# Patient Record
Sex: FEMALE | Race: WHITE | NOT HISPANIC OR LATINO | Employment: STUDENT | URBAN - METROPOLITAN AREA
[De-identification: names, ages, dates, MRNs, and addresses within clinical notes are randomized per-mention and may not be internally consistent; named-entity substitution may affect disease eponyms.]

---
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
## 2020-04-09 DIAGNOSIS — Z30.41 ORAL CONTRACEPTIVE USE: ICD-10-CM

## 2020-04-09 RX ORDER — LEVONORGESTREL AND ETHINYL ESTRADIOL 0.1-0.02MG
KIT ORAL
Qty: 84 TABLET | Refills: 0 | Status: SHIPPED | OUTPATIENT
Start: 2020-04-09 | End: 2020-05-15

## 2020-05-15 ENCOUNTER — TELEMEDICINE (OUTPATIENT)
Dept: GYNECOLOGY | Facility: CLINIC | Age: 20
End: 2020-05-15
Payer: COMMERCIAL

## 2020-05-15 VITALS — WEIGHT: 230 LBS | BODY MASS INDEX: 40.74 KG/M2

## 2020-05-15 DIAGNOSIS — Z30.41 ORAL CONTRACEPTIVE USE: ICD-10-CM

## 2020-05-15 PROCEDURE — 99213 OFFICE O/P EST LOW 20 MIN: CPT | Performed by: NURSE PRACTITIONER

## 2020-05-15 RX ORDER — LEVONORGESTREL AND ETHINYL ESTRADIOL 0.1-0.02MG
1 KIT ORAL DAILY
Qty: 84 TABLET | Refills: 0 | Status: SHIPPED | OUTPATIENT
Start: 2020-05-15 | End: 2020-07-30 | Stop reason: SDUPTHER

## 2020-07-29 NOTE — PROGRESS NOTES
Assessment/Plan:  NGE  Menorrhagia with irregular menses  Menstrual migraines- both controlled with OCPs  BCM- NA         Pap smear q 3yrs  p 21 p [place]  Cervical Cultures till 25        "                                                                        RTO 1 yr  SBA monthly                                                                              Exercise 3/ wk  - 0 to 4/wk, encouraged 4                                                                                       Calcium 1,000 mg/d with Vit D                    Depression Screen: Neg, controlled on medication   Diagnoses and all orders for this visit:    Encounter for annual routine gynecological examination    Menorrhagia with irregular cycle    Oral contraceptive use  -     levonorgestrel-ethinyl estradiol (AVIANE,ALESSE,LESSINA) 0 1-20 MG-MCG per tablet; Take 1 tablet by mouth daily    BMI 38 0-38 9,adult    Other orders  -     doxycycline hyclate (VIBRA-TABS) 100 mg tablet; take 1 tablet by mouth once daily  AN HOUR BEFORE DINNER WITH BUD     (REFER TO PRESCRIPTION NOTES)  Subjective:        Patient ID: Donald Roe is a 21 y o  female  Raisa Yao is here for her yearly evaluation  She is without any gynecologic complaints  Her heavy irregular periods are controlled with oral contraceptives  Her menstrual migraines have resolved with oral contraceptives  And her anxiety is even better  The following portions of the patient's history were reviewed and updated as appropriate: She  has a past medical history of Anxiety and Depression    Patient Active Problem List    Diagnosis Date Noted    Encntr for gyn exam (general) (routine) w/o abn findings 05/09/2019    Oral contraceptive use 05/09/2019    BMI 39 0-39 9,adult 05/09/2019   PMH:  Eczema- as a child  Menarche  Anxiety 7  Depression '15  Irregular Menses - OC's '17  Obesity  Eczema flare 7/20  She  has a past surgical history that includes [place] tooth extraction and Adenoidectomy  Her family history includes Hypertension in her maternal grandmother; No Known Problems in her father, mother, and sister; Prostate cancer in her maternal grandfather and paternal grandfather  FH:  MGF- d 12/19 86 Lymphoma  PGF- Leukemia, terminal  She  reports that she has never smoked  She has never used smokeless tobacco  She reports that she does not drink alcohol or use drugs  SH:  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[place] [address]  September classes will be on line  [employer]  Current Outpatient Medications   Medication Sig Dispense Refill    doxycycline hyclate (VIBRA-TABS) 100 mg tablet take 1 tablet by mouth once daily  AN HOUR BEFORE DINNER WITH BUD     (REFER TO PRESCRIPTION NOTES)   levonorgestrel-ethinyl estradiol (AVIANE,ALESSE,LESSINA) 0 1-20 MG-MCG per tablet Take 1 tablet by mouth daily 84 tablet 3    sertraline (ZOLOFT) 100 mg tablet Take 50 mg by mouth daily      sertraline (ZOLOFT) 25 mg tablet Take 50 mg by mouth daily       No current facility-administered medications for this visit  Current Outpatient Medications on File Prior to Visit   Medication Sig    doxycycline hyclate (VIBRA-TABS) 100 mg tablet take 1 tablet by mouth once daily  AN HOUR BEFORE DINNER WITH BUD     (REFER TO PRESCRIPTION NOTES)   sertraline (ZOLOFT) 100 mg tablet Take 50 mg by mouth daily    sertraline (ZOLOFT) 25 mg tablet Take 50 mg by mouth daily    [DISCONTINUED] levonorgestrel-ethinyl estradiol (AVIANE,ALESSE,LESSINA) 0 1-20 MG-MCG per tablet Take 1 tablet by mouth daily     No current facility-administered medications on file prior to visit  She has No Known Allergies       Review of Systems   Constitutional: Negative for activity change, appetite change, chills, fatigue, fever and unexpected weight change     HENT: Negative for congestion, rhinorrhea, sinus pressure, sore throat and trouble swallowing  Eyes: Negative for discharge, redness, itching and visual disturbance  Respiratory: Negative for cough, chest tightness, shortness of breath and wheezing  Cardiovascular: Negative for chest pain, palpitations and leg swelling  Gastrointestinal: Negative for abdominal distention, abdominal pain, blood in stool, constipation, diarrhea, nausea and vomiting  Genitourinary: Negative for decreased urine volume, difficulty urinating, dyspareunia, dysuria, frequency, hematuria, menstrual problem, pelvic pain, urgency, vaginal bleeding, vaginal discharge and vaginal pain  Virgin   Musculoskeletal: Negative for arthralgias  Skin: Positive for rash (Arms and hands)  Neurological: Negative for weakness, light-headedness, numbness and headaches  Hematological: Does not bruise/bleed easily  Psychiatric/Behavioral: Positive for dysphoric mood  Negative for agitation, behavioral problems and sleep disturbance  The patient is nervous/anxious  Objective:    Vitals:    07/30/20 1054   BP: 118/70   BP Location: Left arm   Patient Position: Sitting   Cuff Size: Standard   Temp: 97 6 °F (36 4 °C)   Weight: 102 kg (224 lb 6 4 oz)   Height: 5' 4" (1 626 m)            Physical Exam   Constitutional: She is oriented to person, place, and time  She appears well-developed and well-nourished  No distress  HENT:   Head: Normocephalic and atraumatic  Eyes: EOM are normal  Right eye exhibits no discharge  Left eye exhibits no discharge  Neck: Normal range of motion  Neck supple  No tracheal deviation present  No thyromegaly present  Cardiovascular: Normal rate, regular rhythm and normal heart sounds  No murmur heard  Pulmonary/Chest: Breath sounds normal  She is in respiratory distress  Abdominal: Soft  She exhibits no distension and no mass  There is no tenderness  There is no rebound and no guarding     Genitourinary: Pelvic exam was performed with patient supine  Genitourinary Comments: Mons normal, normal hair distribution   Musculoskeletal: Normal range of motion  She exhibits no edema or deformity  Lymphadenopathy:     She has no cervical adenopathy  No inguinal adenopathy noted on the right or left side  Neurological: She is alert and oriented to person, place, and time  Skin: Skin is warm and dry  Psychiatric: She has a normal mood and affect  Her behavior is normal  Judgment and thought content normal    Nursing note and vitals reviewed

## 2020-07-30 ENCOUNTER — ANNUAL EXAM (OUTPATIENT)
Dept: GYNECOLOGY | Facility: CLINIC | Age: 20
End: 2020-07-30
Payer: COMMERCIAL

## 2020-07-30 VITALS
HEIGHT: 64 IN | BODY MASS INDEX: 38.31 KG/M2 | SYSTOLIC BLOOD PRESSURE: 118 MMHG | WEIGHT: 224.4 LBS | TEMPERATURE: 97.6 F | DIASTOLIC BLOOD PRESSURE: 70 MMHG

## 2020-07-30 DIAGNOSIS — Z30.41 ORAL CONTRACEPTIVE USE: ICD-10-CM

## 2020-07-30 DIAGNOSIS — Z01.419 ENCOUNTER FOR ANNUAL ROUTINE GYNECOLOGICAL EXAMINATION: Primary | ICD-10-CM

## 2020-07-30 DIAGNOSIS — N92.1 MENORRHAGIA WITH IRREGULAR CYCLE: ICD-10-CM

## 2020-07-30 PROCEDURE — 99395 PREV VISIT EST AGE 18-39: CPT | Performed by: OBSTETRICS & GYNECOLOGY

## 2020-07-30 RX ORDER — DOXYCYCLINE HYCLATE 100 MG
TABLET ORAL
COMMUNITY
Start: 2020-06-18 | End: 2021-03-02 | Stop reason: ALTCHOICE

## 2020-07-30 RX ORDER — LEVONORGESTREL AND ETHINYL ESTRADIOL 0.1-0.02MG
1 KIT ORAL DAILY
Qty: 84 TABLET | Refills: 3 | Status: SHIPPED | OUTPATIENT
Start: 2020-07-30 | End: 2021-08-15

## 2021-03-02 ENCOUNTER — OFFICE VISIT (OUTPATIENT)
Dept: FAMILY MEDICINE CLINIC | Facility: CLINIC | Age: 21
End: 2021-03-02
Payer: COMMERCIAL

## 2021-03-02 VITALS
HEIGHT: 64 IN | HEART RATE: 64 BPM | RESPIRATION RATE: 16 BRPM | WEIGHT: 233.2 LBS | SYSTOLIC BLOOD PRESSURE: 120 MMHG | DIASTOLIC BLOOD PRESSURE: 72 MMHG | BODY MASS INDEX: 39.81 KG/M2

## 2021-03-02 DIAGNOSIS — E66.01 MORBID OBESITY WITH BMI OF 40.0-44.9, ADULT (HCC): ICD-10-CM

## 2021-03-02 DIAGNOSIS — Z11.59 ENCOUNTER FOR SCREENING FOR OTHER VIRAL DISEASES: ICD-10-CM

## 2021-03-02 DIAGNOSIS — F41.8 DEPRESSION WITH ANXIETY: Primary | ICD-10-CM

## 2021-03-02 DIAGNOSIS — N62 MACROMASTIA: ICD-10-CM

## 2021-03-02 PROCEDURE — 3008F BODY MASS INDEX DOCD: CPT | Performed by: FAMILY MEDICINE

## 2021-03-02 PROCEDURE — 3725F SCREEN DEPRESSION PERFORMED: CPT | Performed by: FAMILY MEDICINE

## 2021-03-02 PROCEDURE — 99203 OFFICE O/P NEW LOW 30 MIN: CPT | Performed by: FAMILY MEDICINE

## 2021-03-02 PROCEDURE — 1036F TOBACCO NON-USER: CPT | Performed by: FAMILY MEDICINE

## 2021-03-02 RX ORDER — SERTRALINE HYDROCHLORIDE 25 MG/1
25 TABLET, FILM COATED ORAL DAILY
Qty: 90 TABLET | Refills: 1 | Status: SHIPPED | OUTPATIENT
Start: 2021-03-02 | End: 2021-08-06 | Stop reason: SDUPTHER

## 2021-03-02 RX ORDER — SERTRALINE HYDROCHLORIDE 100 MG/1
100 TABLET, FILM COATED ORAL DAILY
Qty: 90 TABLET | Refills: 1 | Status: SHIPPED | OUTPATIENT
Start: 2021-03-02 | End: 2021-08-06 | Stop reason: SDUPTHER

## 2021-03-02 NOTE — PROGRESS NOTES
Assessment/Plan:    1  Depression with anxiety  - continue Zoloft 125 mg daily, doing well  - follow up with the therapist  - sertraline (ZOLOFT) 25 mg tablet; Take 1 tablet (25 mg total) by mouth daily in addition to 100 mg for a total of 125 mg daily  Dispense: 90 tablet; Refill: 1  - sertraline (ZOLOFT) 100 mg tablet; Take 1 tablet (100 mg total) by mouth daily  Dispense: 90 tablet; Refill: 1    2  Macromastia  - scheduled for breast reduction on 03/16/21  - Novel Coronavirus (Covid-19),PCR [facility] - Collected at [employer] [facility] or [facility]; Future    3  Morbid obesity with BMI of 40 0-44 9     - discussed life style changes    The patient understands and agrees with the treatment plan  Subjective:   Chief Complaint   Patient presents with    [facility]    Breast Problem     Pt states she is having breast reductions surgery and she needs a negative COVID test before the surgery  Surgery 03/16/2021      Patient ID: Mynor Bronson is a 21 y o  female here today for her initial visit as a new patient to be established  Patient is scheduled for breast reduction surgery in [place] on 03/16/21 and needs Covid test 5 days prior  Patient is following with Psychiatrist Dr Tonny Hamilton for depression/ anxiety and doing well on Zoloft 125 mg daily, she has been on Zoloft for about 4 years  She is also seeing her therapist 1-2 times a month  Patient would like to follow up here for her depression  Patient is currently on OCP's for irregular heavy periods with good results         The following portions of the patient's history were reviewed and updated as appropriate: allergies, current medications, past family history, past medical history, past social history, past surgical history and problem list     Past Medical History:   Diagnosis Date    Anxiety     Depression      Past Surgical History:   Procedure Laterality Date    ADENOIDECTOMY      Last assessed 6/14/2014     WISDOM TOOTH EXTRACTION       Family History   Problem Relation Age of Onset    Depression Mother     No Known Problems Father     No Known Problems Sister     Hypertension Maternal Grandmother     Depression Maternal Grandmother     Prostate cancer Maternal Grandfather     Prostate cancer Paternal Grandfather     Depression Maternal Aunt     Alcohol abuse Neg Hx     Substance Abuse Neg Hx      Social History     Socioeconomic History    Marital status: Single     Spouse name: Not on file    Number of children: Not on file    Years of education: Not on file    Highest education level: Not on file   Occupational History    Not on file   Social Needs    Financial resource strain: Not on file    Food insecurity     Worry: Not on file     Inability: Not on file    Transportation needs     Medical: Not on file     Non-medical: Not on file   Tobacco Use    Smoking status: Never Smoker    Smokeless tobacco: Never Used   Substance and Sexual Activity    Alcohol use: Yes     Comment: Rare    Drug use: Never    Sexual activity: Never   Lifestyle    Physical activity     Days per week: Not on file     Minutes per session: Not on file    Stress: Not on file   Relationships    Social connections     Talks on phone: Not on file     Gets together: Not on file     Attends Islam service: Not on file     Active member of club or organization: Not on file     Attends meetings of clubs or organizations: Not on file     Relationship status: Not on file    Intimate partner violence     Fear of current or ex partner: Not on file     Emotionally abused: Not on file     Physically abused: Not on file     Forced sexual activity: Not on file   Other Topics Concern    Not on file   Social History Narrative    Student: 12th grade, [employer]        Current Outpatient Medications:     levonorgestrel-ethinyl estradiol (AVIANE,ALESSE,LESSINA) 0 1-20 MG-MCG per tablet, Take 1 tablet by mouth daily, Disp: 84 tablet, Rfl: 3    sertraline (ZOLOFT) 100 mg tablet, Take 1 tablet (100 mg total) by mouth daily, Disp: 90 tablet, Rfl: 1    sertraline (ZOLOFT) 25 mg tablet, Take 1 tablet (25 mg total) by mouth daily in addition to 100 mg for a total of 125 mg daily, Disp: 90 tablet, Rfl: 1    Review of Systems   Constitutional: Negative for appetite change, chills, fatigue, fever and unexpected weight change  HENT: Negative for congestion, rhinorrhea and sore throat  Eyes: Negative for pain, discharge, redness, itching and visual disturbance  Respiratory: Negative for cough, shortness of breath and wheezing  Cardiovascular: Negative for chest pain, palpitations and leg swelling  Gastrointestinal: Negative for abdominal pain, blood in stool, constipation, diarrhea, nausea and vomiting  Endocrine: Negative for cold intolerance, heat intolerance, polydipsia, polyphagia and polyuria  Genitourinary: Negative for difficulty urinating, dysuria, flank pain, frequency, hematuria, pelvic pain and urgency  Musculoskeletal: Positive for back pain  Negative for arthralgias, joint swelling and myalgias  Neurological: Negative for dizziness, syncope, weakness, numbness and headaches  Hematological: Negative for adenopathy  Psychiatric/Behavioral: Negative for dysphoric mood, sleep disturbance and suicidal ideas  The patient is not nervous/anxious  Objective:    Vitals:    03/02/21 1755   BP: 120/72   Pulse: 64   Resp: 16   Weight: 106 kg (233 lb 3 2 oz)   Height: 5' 4" (1 626 m)        Physical Exam  Constitutional:       General: She is not in acute distress  Appearance: She is well-developed  She is obese  HENT:      Right Ear: Tympanic membrane and ear canal normal       Left Ear: Tympanic membrane and ear canal normal       Mouth/Throat:      Mouth: Mucous membranes are moist       Pharynx: Oropharynx is clear  Eyes:      Extraocular Movements: Extraocular movements intact        Conjunctiva/sclera: Conjunctivae normal       Pupils: Pupils are equal, round, and reactive to light  Neck:      Musculoskeletal: Neck supple  Thyroid: No thyromegaly  Cardiovascular:      Rate and Rhythm: Normal rate and regular rhythm  Heart sounds: Normal heart sounds  No murmur  Pulmonary:      Effort: Pulmonary effort is normal  No respiratory distress  Breath sounds: No wheezing, rhonchi or rales  Abdominal:      General: There is no distension  Palpations: Abdomen is soft  There is no mass  Tenderness: There is no abdominal tenderness  Musculoskeletal:      Right lower leg: No edema  Left lower leg: No edema  Lymphadenopathy:      Cervical: No cervical adenopathy  Neurological:      Mental Status: She is alert and oriented to person, place, and time  Psychiatric:         Mood and Affect: Mood normal          Behavior: Behavior normal          Thought Content: Thought content normal         BMI Counseling: Body mass index is 40 03 kg/m²  The BMI is above normal  Nutrition recommendations include reducing portion sizes, 3-5 servings of fruits/vegetables daily and consuming healthier snacks  Exercise recommendations include exercising 3-5 times per week

## 2021-03-10 ENCOUNTER — TELEPHONE (OUTPATIENT)
Dept: FAMILY MEDICINE CLINIC | Facility: CLINIC | Age: 21
End: 2021-03-10

## 2021-03-10 DIAGNOSIS — Z11.59 ENCOUNTER FOR SCREENING FOR OTHER VIRAL DISEASES: ICD-10-CM

## 2021-03-10 LAB — SARS-COV-2 RNA RESP QL NAA+PROBE: NEGATIVE

## 2021-03-10 PROCEDURE — U0003 INFECTIOUS AGENT DETECTION BY NUCLEIC ACID (DNA OR RNA); SEVERE ACUTE RESPIRATORY SYNDROME CORONAVIRUS 2 (SARS-COV-2) (CORONAVIRUS DISEASE [COVID-19]), AMPLIFIED PROBE TECHNIQUE, MAKING USE OF HIGH THROUGHPUT TECHNOLOGIES AS DESCRIBED BY CMS-2020-01-R: HCPCS | Performed by: FAMILY MEDICINE

## 2021-03-10 PROCEDURE — U0005 INFEC AGEN DETEC AMPLI PROBE: HCPCS | Performed by: FAMILY MEDICINE

## 2021-03-10 NOTE — TELEPHONE ENCOUNTER
----- Message from Emiliana Bocanegra MD sent at 3/10/2021  5:40 PM EST -----  Please let patient know that her COVID-19 swab was negative

## 2021-04-02 ENCOUNTER — TELEMEDICINE (OUTPATIENT)
Dept: FAMILY MEDICINE CLINIC | Facility: CLINIC | Age: 21
End: 2021-04-02
Payer: COMMERCIAL

## 2021-04-02 DIAGNOSIS — Z20.822 EXPOSURE TO COVID-19 VIRUS: Primary | ICD-10-CM

## 2021-04-02 PROCEDURE — 1036F TOBACCO NON-USER: CPT | Performed by: FAMILY MEDICINE

## 2021-04-02 PROCEDURE — 99212 OFFICE O/P EST SF 10 MIN: CPT | Performed by: FAMILY MEDICINE

## 2021-04-04 ENCOUNTER — APPOINTMENT (OUTPATIENT)
Dept: LAB | Facility: HOSPITAL | Age: 21
End: 2021-04-04
Payer: COMMERCIAL

## 2021-04-04 DIAGNOSIS — Z20.822 EXPOSURE TO COVID-19 VIRUS: ICD-10-CM

## 2021-04-04 PROCEDURE — U0005 INFEC AGEN DETEC AMPLI PROBE: HCPCS

## 2021-04-04 PROCEDURE — U0003 INFECTIOUS AGENT DETECTION BY NUCLEIC ACID (DNA OR RNA); SEVERE ACUTE RESPIRATORY SYNDROME CORONAVIRUS 2 (SARS-COV-2) (CORONAVIRUS DISEASE [COVID-19]), AMPLIFIED PROBE TECHNIQUE, MAKING USE OF HIGH THROUGHPUT TECHNOLOGIES AS DESCRIBED BY CMS-2020-01-R: HCPCS

## 2021-04-05 ENCOUNTER — TELEPHONE (OUTPATIENT)
Dept: FAMILY MEDICINE CLINIC | Facility: CLINIC | Age: 21
End: 2021-04-05

## 2021-04-05 LAB — SARS-COV-2 N GENE RESP QL NAA+PROBE: NEGATIVE

## 2021-04-05 NOTE — TELEPHONE ENCOUNTER
----- Message from Sharmin Staton MD sent at 4/5/2021  8:22 AM EDT -----  Please let patient know that her COVID-19 swab was negative

## 2021-08-04 DIAGNOSIS — Z30.41 ORAL CONTRACEPTIVE USE: ICD-10-CM

## 2021-08-05 RX ORDER — MINOCYCLINE HYDROCHLORIDE 100 MG/1
TABLET ORAL
COMMUNITY
Start: 2021-04-29 | End: 2022-01-03 | Stop reason: ALTCHOICE

## 2021-08-05 RX ORDER — MINOCYCLINE 40 MG/G
AEROSOL, FOAM TOPICAL
COMMUNITY
Start: 2021-08-02 | End: 2022-01-03 | Stop reason: ALTCHOICE

## 2021-08-05 RX ORDER — BETAMETHASONE DIPROPIONATE 0.5 MG/G
CREAM TOPICAL
COMMUNITY
Start: 2021-07-27

## 2021-08-05 RX ORDER — LEVONORGESTREL AND ETHINYL ESTRADIOL 0.1-0.02MG
KIT ORAL
Qty: 84 TABLET | Refills: 3 | OUTPATIENT
Start: 2021-08-05

## 2021-08-05 RX ORDER — ADAPALENE AND BENZOYL PEROXIDE 3; 25 MG/G; MG/G
GEL TOPICAL
COMMUNITY
Start: 2021-05-17

## 2021-08-06 ENCOUNTER — OFFICE VISIT (OUTPATIENT)
Dept: FAMILY MEDICINE CLINIC | Facility: CLINIC | Age: 21
End: 2021-08-06
Payer: COMMERCIAL

## 2021-08-06 VITALS
SYSTOLIC BLOOD PRESSURE: 110 MMHG | DIASTOLIC BLOOD PRESSURE: 64 MMHG | BODY MASS INDEX: 39.81 KG/M2 | RESPIRATION RATE: 18 BRPM | HEIGHT: 64 IN | OXYGEN SATURATION: 98 % | HEART RATE: 61 BPM | WEIGHT: 233.2 LBS

## 2021-08-06 DIAGNOSIS — Z13.29 SCREENING FOR THYROID DISORDER: ICD-10-CM

## 2021-08-06 DIAGNOSIS — Z13.1 SCREENING FOR DIABETES MELLITUS: ICD-10-CM

## 2021-08-06 DIAGNOSIS — Z00.00 ANNUAL PHYSICAL EXAM: Primary | ICD-10-CM

## 2021-08-06 DIAGNOSIS — F41.8 DEPRESSION WITH ANXIETY: ICD-10-CM

## 2021-08-06 DIAGNOSIS — Z13.228 SCREENING FOR METABOLIC DISORDER: ICD-10-CM

## 2021-08-06 DIAGNOSIS — Z13.0 SCREENING FOR BLOOD DISEASE: ICD-10-CM

## 2021-08-06 DIAGNOSIS — Z13.220 SCREENING FOR CHOLESTEROL LEVEL: ICD-10-CM

## 2021-08-06 PROCEDURE — 99395 PREV VISIT EST AGE 18-39: CPT | Performed by: FAMILY MEDICINE

## 2021-08-06 PROCEDURE — 3725F SCREEN DEPRESSION PERFORMED: CPT | Performed by: FAMILY MEDICINE

## 2021-08-06 PROCEDURE — 1036F TOBACCO NON-USER: CPT | Performed by: FAMILY MEDICINE

## 2021-08-06 PROCEDURE — 3008F BODY MASS INDEX DOCD: CPT | Performed by: FAMILY MEDICINE

## 2021-08-06 RX ORDER — SERTRALINE HYDROCHLORIDE 25 MG/1
25 TABLET, FILM COATED ORAL DAILY
Qty: 90 TABLET | Refills: 3 | Status: SHIPPED | OUTPATIENT
Start: 2021-08-06 | End: 2022-08-01 | Stop reason: SDUPTHER

## 2021-08-06 RX ORDER — SERTRALINE HYDROCHLORIDE 100 MG/1
100 TABLET, FILM COATED ORAL DAILY
Qty: 90 TABLET | Refills: 3 | Status: SHIPPED | OUTPATIENT
Start: 2021-08-06 | End: 2022-08-01 | Stop reason: SDUPTHER

## 2021-08-06 NOTE — PATIENT INSTRUCTIONS

## 2021-08-06 NOTE — PROGRESS NOTES
ADULT ANNUAL PHYSICAL  [facility]    NAME: Mervat Grey  AGE: 24 y o  SEX: female  : 2000     DATE: 2021     Assessment and Plan:     1  Annual physical exam  - will obtain immunization records from prior PCP  - will obtain screening labs and call with the results  - CBC and differential; Future  - Lipid Panel with Direct LDL reflex; Future  - Comprehensive metabolic panel; Future  - TSH, 3rd generation with Free T4 reflex; Future    2  Depression with anxiety  - continue Zoloft 125 mg daily, doing well  - follow up with the therapist    Immunizations and preventive care screenings were discussed with patient today  Appropriate education was printed on patient's after visit summary  Counseling:  Alcohol/drug use: discussed moderation in alcohol intake, the recommendations for healthy alcohol use, and avoidance of illicit drug use  Dental Health: discussed importance of regular tooth brushing, flossing, and dental visits  Injury prevention: discussed safety/seat belts, safety helmets, smoke detectors, carbon dioxide detectors, and smoking near bedding or upholstery  Sexual health: discussed sexually transmitted diseases, partner selection, use of condoms, avoidance of unintended pregnancy, and contraceptive alternatives  · Exercise: the importance of regular exercise/physical activity was discussed  Recommend exercise 3-5 times per week for at least 30 minutes  Return in about 5 months (around 1/10/2022) for Recheck  Chief Complaint:     Chief Complaint   Patient presents with    Well Check      History of Present Illness:     Adult Annual Physical   Patient here for a comprehensive physical exam  The patient reports no problems  Diet and Physical Activity  · Diet/Nutrition: well balanced diet  · Exercise: walking        Depression Screening  PHQ-9 Depression Screening    PHQ-9:   Frequency of the following problems over the past two weeks:      Little interest or pleasure in doing things: 0 - not at all  Feeling down, depressed, or hopeless: 0 - not at all  PHQ-2 Score: 0       General Health  · Sleep: sleeps well  · Hearing: normal - bilateral   · Vision: goes for regular eye exams, wears glasses and wears contacts  · Dental: regular dental visits  /GYN Health  · Last menstrual period: regular periods on OCP's, no issues  · Contraceptive method: oral contraceptives  · History of STDs?: no      Review of Systems:     Review of Systems   Constitutional: Negative for appetite change, chills, fatigue, fever and unexpected weight change  HENT: Negative for congestion, ear pain, nosebleeds, rhinorrhea, sore throat and trouble swallowing  Eyes: Negative for pain, discharge, redness, itching and visual disturbance  Respiratory: Negative for cough, shortness of breath and wheezing  Cardiovascular: Negative for chest pain, palpitations and leg swelling  Gastrointestinal: Negative for abdominal pain, blood in stool, constipation, diarrhea, nausea and vomiting  Endocrine: Negative for cold intolerance, heat intolerance, polydipsia and polyuria  Genitourinary: Negative for dysuria, frequency, hematuria, pelvic pain, urgency and vaginal discharge  Musculoskeletal: Negative for arthralgias, back pain, joint swelling and myalgias  Skin: Negative for rash  Neurological: Negative for dizziness, syncope, weakness, numbness and headaches  Hematological: Negative for adenopathy  Psychiatric/Behavioral: Negative for dysphoric mood and sleep disturbance  The patient is not nervous/anxious         Past Medical History:     Past Medical History:   Diagnosis Date    Anxiety     Depression       Past Surgical History:     Past Surgical History:   Procedure Laterality Date    ADENOIDECTOMY      Last assessed 6/14/2014     BREAST SURGERY  03/16/2021    breast reduction    WISDOM TOOTH EXTRACTION        Social History:     Social History     Socioeconomic History    Marital status: Single     Spouse name: None    Number of children: None    Years of education: None    Highest education level: None   Occupational History    None   Tobacco Use    Smoking status: Never Smoker    Smokeless tobacco: Never Used   Vaping Use    Vaping Use: Never used   Substance and Sexual Activity    Alcohol use: Yes     Comment: Rare    Drug use: Never    Sexual activity: Never   Other Topics Concern    None   Social History Narrative    Student: 12th grade, [employer]      Social Determinants of Health     Financial Resource Strain:     Difficulty of Paying Living Expenses:    Food Insecurity:     Worried About Running Out of Food in the Last Year:     Ran Out of Food in the Last Year:    Transportation Needs:     Lack of Transportation (Medical):      Lack of Transportation (Non-Medical):    Physical Activity:     Days of Exercise per Week:     Minutes of Exercise per Session:    Stress:     Feeling of Stress :    Social Connections:     Frequency of Communication with Friends and Family:     Frequency of Social Gatherings with Friends and Family:     Attends Bahai Services:     Active Member of Clubs or Organizations:     Attends Club or Organization Meetings:     Marital Status:    Intimate Partner Violence:     Fear of Current or Ex-Partner:     Emotionally Abused:     Physically Abused:     Sexually Abused:       Family History:     Family History   Problem Relation Age of Onset    Depression Mother     Hyperlipidemia Father     No Known Problems Sister     Hypertension Maternal Grandmother     Depression Maternal Grandmother     Prostate cancer Maternal Grandfather     Prostate cancer Paternal Grandfather     Depression Maternal Aunt     Alcohol abuse Neg Hx     Substance Abuse Neg Hx       Current Medications:     Current Outpatient Medications   Medication Sig Dispense Refill    Amzeeq 4 % FOAM       betamethasone dipropionate (DIPROSONE) 0 05 % cream       Epiduo Forte 0 3-2 5 % GEL       levonorgestrel-ethinyl estradiol (AVIANE,ALESSE,LESSINA) 0 1-20 MG-MCG per tablet Take 1 tablet by mouth daily 84 tablet 3    sertraline (ZOLOFT) 100 mg tablet Take 1 tablet (100 mg total) by mouth daily 90 tablet 3    sertraline (ZOLOFT) 25 mg tablet Take 1 tablet (25 mg total) by mouth daily in addition to 100 mg for a total of 125 mg daily 90 tablet 3    minocycline (DYNACIN) 100 MG tablet  (Patient not taking: Reported on 8/6/2021)       No current facility-administered medications for this visit  Allergies:     No Known Allergies   Physical Exam:     /64   Pulse 61   Resp 18   Ht 5' 4 37" (1 635 m)   Wt 106 kg (233 lb 3 2 oz)   LMP 07/26/2021 (Exact Date)   SpO2 98%   BMI 39 57 kg/m²     Physical Exam  Constitutional:       General: She is not in acute distress  Appearance: She is well-developed  HENT:      Head: Normocephalic and atraumatic  Right Ear: Tympanic membrane, ear canal and external ear normal       Left Ear: Tympanic membrane, ear canal and external ear normal       Mouth/Throat:      Mouth: Mucous membranes are moist       Pharynx: Oropharynx is clear  Eyes:      General: No scleral icterus  Extraocular Movements: Extraocular movements intact  Conjunctiva/sclera: Conjunctivae normal       Pupils: Pupils are equal, round, and reactive to light  Neck:      Thyroid: No thyromegaly  Vascular: No JVD  Cardiovascular:      Rate and Rhythm: Normal rate and regular rhythm  Heart sounds: Normal heart sounds  No murmur heard  Pulmonary:      Effort: Pulmonary effort is normal  No respiratory distress  Breath sounds: Normal breath sounds  No wheezing, rhonchi or rales  Abdominal:      General: Bowel sounds are normal  There is no distension  Palpations: Abdomen is soft  There is no mass  Tenderness: There is no abdominal tenderness  Musculoskeletal:      Cervical back: Neck supple  Right lower leg: No edema  Left lower leg: No edema  Lymphadenopathy:      Cervical: No cervical adenopathy  Skin:     Findings: No rash  Neurological:      General: No focal deficit present  Mental Status: She is alert and oriented to person, place, and time  Cranial Nerves: No cranial nerve deficit  Psychiatric:         Mood and Affect: Mood normal          Behavior: Behavior normal          Thought Content:  Thought content normal          Judgment: Judgment normal         Jo Khalil MD   [facility]

## 2021-08-10 LAB
ALBUMIN SERPL-MCNC: 4.4 G/DL (ref 3.9–5)
ALBUMIN/GLOB SERPL: 1.5 {RATIO} (ref 1.2–2.2)
ALP SERPL-CCNC: 82 IU/L (ref 48–121)
ALT SERPL-CCNC: 36 IU/L (ref 0–32)
AST SERPL-CCNC: 22 IU/L (ref 0–40)
BASOPHILS # BLD AUTO: 0 X10E3/UL (ref 0–0.2)
BASOPHILS NFR BLD AUTO: 0 %
BILIRUB SERPL-MCNC: 0.3 MG/DL (ref 0–1.2)
BUN SERPL-MCNC: 11 MG/DL (ref 6–20)
BUN/CREAT SERPL: 16 (ref 9–23)
CALCIUM SERPL-MCNC: 9.8 MG/DL (ref 8.7–10.2)
CHLORIDE SERPL-SCNC: 103 MMOL/L (ref 96–106)
CHOLEST SERPL-MCNC: 189 MG/DL (ref 100–199)
CO2 SERPL-SCNC: 25 MMOL/L (ref 20–29)
CREAT SERPL-MCNC: 0.68 MG/DL (ref 0.57–1)
EOSINOPHIL # BLD AUTO: 0.1 X10E3/UL (ref 0–0.4)
EOSINOPHIL NFR BLD AUTO: 1 %
ERYTHROCYTE [DISTWIDTH] IN BLOOD BY AUTOMATED COUNT: 12.4 % (ref 11.7–15.4)
GLOBULIN SER-MCNC: 2.9 G/DL (ref 1.5–4.5)
GLUCOSE SERPL-MCNC: 98 MG/DL (ref 65–99)
HCT VFR BLD AUTO: 38.9 % (ref 34–46.6)
HDLC SERPL-MCNC: 43 MG/DL
HGB BLD-MCNC: 13 G/DL (ref 11.1–15.9)
IMM GRANULOCYTES # BLD: 0 X10E3/UL (ref 0–0.1)
IMM GRANULOCYTES NFR BLD: 0 %
LDLC SERPL CALC-MCNC: 120 MG/DL (ref 0–99)
LYMPHOCYTES # BLD AUTO: 3.1 X10E3/UL (ref 0.7–3.1)
LYMPHOCYTES NFR BLD AUTO: 33 %
MCH RBC QN AUTO: 30 PG (ref 26.6–33)
MCHC RBC AUTO-ENTMCNC: 33.4 G/DL (ref 31.5–35.7)
MCV RBC AUTO: 90 FL (ref 79–97)
MONOCYTES # BLD AUTO: 0.6 X10E3/UL (ref 0.1–0.9)
MONOCYTES NFR BLD AUTO: 6 %
NEUTROPHILS # BLD AUTO: 5.4 X10E3/UL (ref 1.4–7)
NEUTROPHILS NFR BLD AUTO: 60 %
PLATELET # BLD AUTO: 266 X10E3/UL (ref 150–450)
POTASSIUM SERPL-SCNC: 4.6 MMOL/L (ref 3.5–5.2)
PROT SERPL-MCNC: 7.3 G/DL (ref 6–8.5)
RBC # BLD AUTO: 4.34 X10E6/UL (ref 3.77–5.28)
SL AMB EGFR AFRICAN AMERICAN: 145 ML/MIN/1.73
SL AMB EGFR NON AFRICAN AMERICAN: 125 ML/MIN/1.73
SODIUM SERPL-SCNC: 139 MMOL/L (ref 134–144)
TRIGL SERPL-MCNC: 146 MG/DL (ref 0–149)
TSH SERPL DL<=0.005 MIU/L-ACNC: 2.55 UIU/ML (ref 0.45–4.5)
WBC # BLD AUTO: 9.3 X10E3/UL (ref 3.4–10.8)

## 2021-08-12 NOTE — PROGRESS NOTES
Patient's Lab: [facility]    Last Yearly: 7/30/20  Last Pap: 1st today  BC: Abraham Johnsonjarret  Are you taking consistently: Yes  LMP: 7/26/21  Menses flow: 28d/lasting 3-7/light to moderate flow/pad usage/moderate cramping  S/P HPV Series: Yes out-of-network [facility]  S/P Covid Shot:Complete  STD Testing Today?: No  Sexually Active?: Never    HX: Menorrhagia with Irregular Cycle, Menstrual Migraines, Anxiety    Q's/Concerns: Patient states her bleeding, cycles, and migraines are under control with her [facility]

## 2021-08-15 NOTE — PROGRESS NOTES
Assessment/Plan:    Pap every 3 years if normal-done today;  STI testing as indicated-declined  Exercise most days of week-minimum of 150 minutes per week  Weight loss encouraged  Obtain appropriate diet and hydration  Calcium 1000mg + 600 vit D daily  Birth control as directed (ACHES reviewed)  Benefits, risks and alternatives discussed/reviewed  Condom use when sexually active for sexually transmitted infection prevention  HPV vaccine series completed  Annual mammogram starting at age 36, monthly breast self exam   Referred to a nutritionist           Diagnoses and all orders for this visit:    Encounter for gynecological examination (general) (routine) without abnormal findings  -     IGP, rfx Aptima HPV ASCU    Encounter for Papanicolaou smear for cervical cancer screening  -     IGP, rfx Aptima HPV ASCU    Oral contraceptive use  -     levonorgestrel-ethinyl estradiol (AVIANE,ALESSE,LESSINA) 0 1-20 MG-MCG per tablet; Take 1 tablet by mouth daily    BMI 40 0-44 9, adult ([facility] [address] )  -     Ambulatory referral to Nutrition Services; Future    Other orders  -     Cancel: IGP, CtNg, rfx Aptima HPV ASCU          Subjective:      Patient ID: Jose F Louis is a 24 y o  female  Jose F Louis is a 24 y o  female who is here today for her annual visit  No health concerns  She is nervous about having a pelvic exam  She had  breast reduction surgery on 3/21  Anxiety and depression are stable  Monthly menses x 3-7 days with light to moderated flow  Menses is acceptable  Uses pad as tampons are (uncomfortable)  Not currently exercising but plans to restart soon  Jose F Louis has never been sexually active and is not currently dating  She will return to the [address] [place] [address] later this month for her communications studies  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         The following portions of the patient's history were reviewed and updated as appropriate: allergies, current medications, past family history, past medical history, past social history, past surgical history and problem list     Review of Systems   Constitutional: Negative  Negative for activity change, appetite change, chills, diaphoresis, fatigue, fever and unexpected weight change  HENT: Negative for congestion, dental problem, sneezing, sore throat and trouble swallowing  Eyes: Negative for visual disturbance  Respiratory: Negative for chest tightness and shortness of breath  Cardiovascular: Negative for chest pain and leg swelling  Gastrointestinal: Negative for abdominal pain, constipation, diarrhea, nausea and vomiting  Genitourinary: Negative for difficulty urinating, dysuria, frequency, hematuria, menstrual problem, pelvic pain, urgency, vaginal bleeding, vaginal discharge and vaginal pain  Musculoskeletal: Negative for back pain and neck pain  Skin: Negative  Allergic/Immunologic: Negative  Neurological: Negative for weakness and headaches  Hematological: Negative for adenopathy  Psychiatric/Behavioral: The patient is nervous/anxious  Objective:      /72 (BP Location: Left arm, Patient Position: Sitting, Cuff Size: Large)   Ht 5' 4" (1 626 m)   Wt 107 kg (235 lb 6 4 oz)   LMP 07/26/2021 (Exact Date)   BMI 40 41 kg/m²          Physical Exam  Vitals and nursing note reviewed  Constitutional:       Appearance: Normal appearance  She is well-developed  HENT:      Head: Normocephalic and atraumatic  Eyes:      General:         Right eye: No discharge  Left eye: No discharge  Neck:      Thyroid: No thyromegaly  Trachea: Trachea normal    Cardiovascular:      Rate and Rhythm: Normal rate and regular rhythm  Heart sounds: Normal heart sounds  Pulmonary:      Effort: Pulmonary effort is normal       Breath sounds: Normal breath sounds     Chest:      Breasts: Breasts are symmetrical          Right: Normal  No inverted nipple, mass, nipple discharge, skin change or tenderness  Left: Normal  No inverted nipple, mass, nipple discharge, skin change or tenderness  Comments: Bilateral breast reduction scars as noted on diagram  Abdominal:      Palpations: Abdomen is soft  Genitourinary:     General: Normal vulva  Exam position: Lithotomy position  Labia:         Right: No rash, tenderness, lesion or injury  Left: No rash, tenderness, lesion or injury  Urethra: No prolapse, urethral pain, urethral swelling or urethral lesion  Vagina: Normal  No signs of injury and foreign body  No vaginal discharge, erythema, tenderness or bleeding  Cervix: Normal       Uterus: Normal        Adnexa: Right adnexa normal and left adnexa normal         Right: No mass, tenderness or fullness  Left: No mass, tenderness or fullness  Rectum: No external hemorrhoid  Comments: Guarded during pelvic exam  Musculoskeletal:         General: Normal range of motion  Cervical back: Normal range of motion and neck supple  Lymphadenopathy:      Head:      Right side of head: No submental, submandibular or tonsillar adenopathy  Left side of head: No submental, submandibular or tonsillar adenopathy  Cervical: No cervical adenopathy  Upper Body:      Right upper body: No supraclavicular or axillary adenopathy  Left upper body: No supraclavicular or axillary adenopathy  Lower Body: No right inguinal adenopathy  No left inguinal adenopathy  Skin:     General: Skin is warm and dry  Neurological:      Mental Status: She is alert and oriented to person, place, and time     Psychiatric:         Mood and Affect: Mood normal          Behavior: Behavior normal

## 2021-08-16 ENCOUNTER — ANNUAL EXAM (OUTPATIENT)
Dept: OBGYN CLINIC | Facility: CLINIC | Age: 21
End: 2021-08-16
Payer: COMMERCIAL

## 2021-08-16 VITALS
HEIGHT: 64 IN | DIASTOLIC BLOOD PRESSURE: 72 MMHG | WEIGHT: 235.4 LBS | BODY MASS INDEX: 40.19 KG/M2 | SYSTOLIC BLOOD PRESSURE: 110 MMHG

## 2021-08-16 DIAGNOSIS — Z12.4 ENCOUNTER FOR PAPANICOLAOU SMEAR FOR CERVICAL CANCER SCREENING: ICD-10-CM

## 2021-08-16 DIAGNOSIS — Z30.41 ORAL CONTRACEPTIVE USE: ICD-10-CM

## 2021-08-16 DIAGNOSIS — Z01.419 ENCOUNTER FOR GYNECOLOGICAL EXAMINATION (GENERAL) (ROUTINE) WITHOUT ABNORMAL FINDINGS: Primary | ICD-10-CM

## 2021-08-16 PROCEDURE — 99395 PREV VISIT EST AGE 18-39: CPT | Performed by: NURSE PRACTITIONER

## 2021-08-16 PROCEDURE — 3008F BODY MASS INDEX DOCD: CPT | Performed by: FAMILY MEDICINE

## 2021-08-16 RX ORDER — LEVONORGESTREL AND ETHINYL ESTRADIOL 0.1-0.02MG
1 KIT ORAL DAILY
Qty: 84 TABLET | Refills: 3 | Status: SHIPPED | OUTPATIENT
Start: 2021-08-16

## 2021-08-16 NOTE — PATIENT INSTRUCTIONS
Pap every 3 years if normal-done today;  STI testing as indicated-declined  Exercise most days of week-minimum of 150 minutes per week  Weight loss encouraged  Obtain appropriate diet and hydration  Calcium 1000mg + 600 vit D daily  Birth control as directed (ACHES reviewed)  Benefits, risks and alternatives discussed/reviewed  Condom use when sexually active for sexually transmitted infection prevention  HPV vaccine series completed      Annual mammogram starting at age 36, monthly breast self exam   Referred to a nutritionist

## 2021-08-17 LAB
CYTOLOGIST CVX/VAG CYTO: NORMAL
DX ICD CODE: NORMAL
OTHER STN SPEC: NORMAL
OTHER STN SPEC: NORMAL
PATH REPORT.FINAL DX SPEC: NORMAL
SL AMB NOTE:: NORMAL
SL AMB SPECIMEN ADEQUACY: NORMAL
SL AMB TEST METHODOLOGY: NORMAL

## 2021-12-28 ENCOUNTER — TELEPHONE (OUTPATIENT)
Dept: FAMILY MEDICINE CLINIC | Facility: CLINIC | Age: 21
End: 2021-12-28

## 2021-12-28 PROCEDURE — U0003 INFECTIOUS AGENT DETECTION BY NUCLEIC ACID (DNA OR RNA); SEVERE ACUTE RESPIRATORY SYNDROME CORONAVIRUS 2 (SARS-COV-2) (CORONAVIRUS DISEASE [COVID-19]), AMPLIFIED PROBE TECHNIQUE, MAKING USE OF HIGH THROUGHPUT TECHNOLOGIES AS DESCRIBED BY CMS-2020-01-R: HCPCS | Performed by: FAMILY MEDICINE

## 2021-12-28 PROCEDURE — U0005 INFEC AGEN DETEC AMPLI PROBE: HCPCS | Performed by: FAMILY MEDICINE

## 2022-01-03 ENCOUNTER — OFFICE VISIT (OUTPATIENT)
Dept: FAMILY MEDICINE CLINIC | Facility: CLINIC | Age: 22
End: 2022-01-03
Payer: COMMERCIAL

## 2022-01-03 VITALS
TEMPERATURE: 98.6 F | BODY MASS INDEX: 38.76 KG/M2 | HEART RATE: 100 BPM | HEIGHT: 64 IN | DIASTOLIC BLOOD PRESSURE: 80 MMHG | RESPIRATION RATE: 16 BRPM | SYSTOLIC BLOOD PRESSURE: 122 MMHG | WEIGHT: 227 LBS

## 2022-01-03 DIAGNOSIS — R53.83 FATIGUE, UNSPECIFIED TYPE: ICD-10-CM

## 2022-01-03 DIAGNOSIS — J06.9 UPPER RESPIRATORY TRACT INFECTION, UNSPECIFIED TYPE: Primary | ICD-10-CM

## 2022-01-03 PROCEDURE — 1036F TOBACCO NON-USER: CPT | Performed by: NURSE PRACTITIONER

## 2022-01-03 PROCEDURE — 3008F BODY MASS INDEX DOCD: CPT | Performed by: NURSE PRACTITIONER

## 2022-01-03 PROCEDURE — 99214 OFFICE O/P EST MOD 30 MIN: CPT | Performed by: NURSE PRACTITIONER

## 2022-01-03 RX ORDER — FLUTICASONE PROPIONATE 50 MCG
1 SPRAY, SUSPENSION (ML) NASAL DAILY
Qty: 9.9 ML | Refills: 0 | Status: SHIPPED | OUTPATIENT
Start: 2022-01-03

## 2022-01-03 RX ORDER — GUAIFENESIN 600 MG
1200 TABLET, EXTENDED RELEASE 12 HR ORAL EVERY 12 HOURS SCHEDULED
Qty: 14 TABLET | Refills: 0 | Status: SHIPPED | OUTPATIENT
Start: 2022-01-03

## 2022-01-03 NOTE — PROGRESS NOTES
Assessment/Plan:     Diagnoses and all orders for this visit:    Upper respiratory tract infection, unspecified type  -     guaiFENesin (MUCINEX) 600 mg 12 hr tablet; Take 2 tablets (1,200 mg total) by mouth every 12 (twelve) hours  -     fluticasone (FLONASE) 50 mcg/act nasal spray; 1 spray into each nostril daily    Patient appears to be on the up-swing of her illness  To help her get rid of the lingering mucus, she is to take Mucinex BID w/ full glass of water & Flonase QD for the next 5 days  Patient is encouraged to rest and keep well hydrated  She may take Vitamin C & Zinc to help boost her immune system  Patient is encouraged to call our office for any questions/concerns, persistent or worsening symptoms  Patient states they understand and agree with treatment plan  Fatigue, unspecified type  -     Mononucleosis screen; Future      Mono test ordered in the event patient is still experiencing fatigue after symptoms resolve  Patient is encouraged to call our office for any questions/concerns, persistent or worsening symptoms  Patient states they understand and agree with treatment plan  Pt to f/u PRN  Subjective:      Patient ID: Xochitl Ernandez is a 24 y o  female  Patient presents today for nasal congestion, productive cough with green mucus, and some fatigue  She notes her symptoms started approximately 3 weeks ago, and she had one day of low grade fever, chills & hot flashes  She notes she was tested at her [place] [facility]  For covid & flu and was negative  Patient notes she was also tested for our office on 12/28 for covid and was negative  She denies fever, chills, ear pain/pressure, headaches, body aches  She had taken Sudafed several times, but has not taken anything else OTC  Overall, she does feel like she is improving, but wanted to be evaluated in office  Patient notes she is eating & drinking well and has been able to sleep at night          The following portions of the patient's history were reviewed and updated as appropriate: allergies, current medications, past family history, past medical history, past social history, past surgical history and problem list     Review of Systems   Constitutional: Positive for fatigue  Negative for activity change, appetite change, chills and fever  HENT: Positive for congestion, postnasal drip and rhinorrhea  Negative for ear discharge, ear pain, sinus pressure, sinus pain and sore throat  Eyes: Negative  Respiratory: Positive for cough (productive, green mucus per pt)  Negative for chest tightness and shortness of breath  Cardiovascular: Negative  Negative for chest pain and palpitations  Gastrointestinal: Negative  Genitourinary: Negative  Musculoskeletal: Negative  Negative for myalgias  Neurological: Negative  Negative for headaches  Objective:      /80 (BP Location: Left arm, Patient Position: Sitting, Cuff Size: Standard)   Pulse 100   Temp 98 6 °F (37 °C) (Oral)   Resp 16   Ht 5' 4" (1 626 m)   Wt 103 kg (227 lb)   Breastfeeding No   BMI 38 96 kg/m²          Physical Exam  Vitals reviewed  Constitutional:       General: She is not in acute distress  Appearance: Normal appearance  She is not ill-appearing  HENT:      Head: Normocephalic  Right Ear: Tympanic membrane, ear canal and external ear normal       Left Ear: Tympanic membrane, ear canal and external ear normal       Nose: Rhinorrhea present  Mouth/Throat:      Pharynx: No oropharyngeal exudate or posterior oropharyngeal erythema  Cardiovascular:      Rate and Rhythm: Normal rate and regular rhythm  Pulses: Normal pulses  Heart sounds: Normal heart sounds  No murmur heard  Pulmonary:      Effort: Pulmonary effort is normal  No respiratory distress  Breath sounds: Normal breath sounds  No wheezing  Musculoskeletal:         General: Normal range of motion  Skin:     General: Skin is warm and dry  Neurological:      Mental Status: She is alert and oriented to person, place, and time  Mental status is at baseline     Psychiatric:         Mood and Affect: Mood normal          Behavior: Behavior normal

## 2022-01-05 LAB
ALBUMIN SERPL-MCNC: 4.7 G/DL (ref 3.9–5)
ALP SERPL-CCNC: 83 IU/L (ref 44–121)
ALT SERPL-CCNC: 18 IU/L (ref 0–32)
AST SERPL-CCNC: 17 IU/L (ref 0–40)
BILIRUB DIRECT SERPL-MCNC: 0.16 MG/DL (ref 0–0.4)
BILIRUB SERPL-MCNC: 0.4 MG/DL (ref 0–1.2)
HETEROPH AB SER QL LA: POSITIVE
PROT SERPL-MCNC: 7.7 G/DL (ref 6–8.5)

## 2022-01-11 ENCOUNTER — TELEPHONE (OUTPATIENT)
Dept: FAMILY MEDICINE CLINIC | Facility: CLINIC | Age: 22
End: 2022-01-11

## 2022-04-06 ENCOUNTER — TELEPHONE (OUTPATIENT)
Dept: FAMILY MEDICINE CLINIC | Facility: CLINIC | Age: 22
End: 2022-04-06

## 2022-04-06 NOTE — TELEPHONE ENCOUNTER
Artis Ackerman is away at school and has been sick 3 times since seeing you in January  She got sick with cold symptoms twice that eventually resolved on their own but came back and now sick again  Is asking if she can have a virtual visit with you?

## 2022-04-12 ENCOUNTER — TELEPHONE (OUTPATIENT)
Dept: FAMILY MEDICINE CLINIC | Facility: CLINIC | Age: 22
End: 2022-04-12

## 2022-04-12 NOTE — TELEPHONE ENCOUNTER
Patient called to let you know she is covid positive  Patient started congestion 2 weeks ago no other symptoms  Tested positive 4/7    Tired with a lot of mucus  Was told by doctor up at school to take sudafed  Does not feel sudafed is helping anything else you can recommend  She does not feel she needs a virtual apt  Patient is asking how long to quarantine, didn't know how to respond since she stated she had congestion for 2 weeks

## 2022-08-01 DIAGNOSIS — F41.8 DEPRESSION WITH ANXIETY: ICD-10-CM

## 2022-08-01 RX ORDER — SERTRALINE HYDROCHLORIDE 25 MG/1
25 TABLET, FILM COATED ORAL DAILY
Qty: 90 TABLET | Refills: 1 | Status: SHIPPED | OUTPATIENT
Start: 2022-08-01

## 2022-08-01 RX ORDER — SERTRALINE HYDROCHLORIDE 100 MG/1
100 TABLET, FILM COATED ORAL DAILY
Qty: 90 TABLET | Refills: 1 | Status: SHIPPED | OUTPATIENT
Start: 2022-08-01

## 2022-08-17 PROBLEM — N92.1 MENORRHAGIA WITH IRREGULAR CYCLE: Status: ACTIVE | Noted: 2022-08-17

## 2022-08-17 PROBLEM — Z01.419 ENCOUNTER FOR ANNUAL ROUTINE GYNECOLOGICAL EXAMINATION: Status: ACTIVE | Noted: 2022-08-17

## 2022-08-17 NOTE — PROGRESS NOTES
Assessment/Plan:  NGE  Menorrhagia with irregular menses  Menstrual migraines- both controlled with OCPs - recent breakthrough bleeding due to noncompliance  BCM- NA                                                                Pap smear q 3yrs  p 21 p [place] - done '21, still a virgin  Cervical Cultures till 25        "                                                                        RTO 1 yr  SBA monthly                                                                              Exercise 3/ wk  - 0 to 4/wk, encouraged 4                                                                                       Calcium 1,000 mg/d with Vit D                     Depression Screen: Neg, controlled on medication   Diagnoses and all orders for this visit:    Encounter for annual routine gynecological examination    Menorrhagia with irregular cycle    Oral contraceptive use  -     levonorgestrel-ethinyl estradiol (AVIANE,ALESSE,LESSINA) 0 1-20 MG-MCG per tablet; Take 1 tablet by mouth daily    BMI 39 0-39 9,adult              Subjective:        Patient ID: Billie Briceno is a 25 y o  female  Trinda Nona presents today for a yearly evaluation  She has noted some irregular bleeding for the past 2 months  She denies any new medications  She has been on Zoloft for the past 4 years  Weight is been stable for the past year or so  Was in a new environment 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 [place]  She enjoyed it  However during this time she missed several pills  She has never been sexually active  She usually has her menses on the 3rd or 4th placebo day  She did have COVID in April  The following portions of the patient's history were reviewed and updated as appropriate: She  has a past medical history of Anxiety and Depression    Patient Active Problem List    Diagnosis Date Noted  Encounter for annual routine gynecological examination 08/17/2022    Menorrhagia with irregular cycle 08/17/2022    Encntr for gyn exam (general) (routine) w/o abn findings 05/09/2019    Oral contraceptive use 05/09/2019    BMI 39 0-39 9,adult 05/09/2019   PMH:  Eczema- as a child  Menarche  Anxiety 7  Depression '15  Irregular Menses - OC's '17  Obesity  Eczema flare 7/20  Breast reduction surgery 3/21  Mononucleosis 1/22  Covid 4/22  She  has a past surgical history that includes [place] tooth extraction; Adenoidectomy; and Breast surgery (03/16/2021)  Her family history includes Depression in her maternal aunt, maternal grandmother, and mother; Hyperlipidemia in her father; Hypertension in her maternal grandmother and mother; No Known Problems in her sister; Prostate cancer in her maternal grandfather and paternal grandfather  FH:  MGF- d 12/19 86 Lymphoma  PGF- Leukemia, terminal  She  reports that she has never smoked  She has never used smokeless tobacco  She reports current alcohol use  She reports that she does not use drugs  SH:  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[place] [address]    Graduating this December  Current Outpatient Medications   Medication Sig Dispense Refill    betamethasone dipropionate (DIPROSONE) 0 05 % cream       Epiduo Forte 0 3-2 5 % GEL       levonorgestrel-ethinyl estradiol (AVIANE,ALESSE,LESSINA) 0 1-20 MG-MCG per tablet Take 1 tablet by mouth daily 84 tablet 3    sertraline (ZOLOFT) 100 mg tablet Take 1 tablet (100 mg total) by mouth daily 90 tablet 1    sertraline (ZOLOFT) 25 mg tablet Take 1 tablet (25 mg total) by mouth daily in addition to 100 mg for a total of 125 mg daily 90 tablet 1    fluticasone (FLONASE) 50 mcg/act nasal spray 1 spray into each nostril daily (Patient not taking: Reported on 8/18/2022) 9 9 mL 0    guaiFENesin (MUCINEX) 600 mg 12 hr tablet Take 2 tablets (1,200 mg total) by mouth every 12 (twelve) hours (Patient not taking: Reported on 8/18/2022) 14 tablet 0     No current facility-administered medications for this visit  Current Outpatient Medications on File Prior to Visit   Medication Sig    betamethasone dipropionate (DIPROSONE) 0 05 % cream     Epiduo Forte 0 3-2 5 % GEL     sertraline (ZOLOFT) 100 mg tablet Take 1 tablet (100 mg total) by mouth daily    sertraline (ZOLOFT) 25 mg tablet Take 1 tablet (25 mg total) by mouth daily in addition to 100 mg for a total of 125 mg daily    [DISCONTINUED] levonorgestrel-ethinyl estradiol (AVIANE,ALESSE,LESSINA) 0 1-20 MG-MCG per tablet Take 1 tablet by mouth daily    fluticasone (FLONASE) 50 mcg/act nasal spray 1 spray into each nostril daily (Patient not taking: Reported on 8/18/2022)    guaiFENesin (MUCINEX) 600 mg 12 hr tablet Take 2 tablets (1,200 mg total) by mouth every 12 (twelve) hours (Patient not taking: Reported on 8/18/2022)     No current facility-administered medications on file prior to visit  She has No Known Allergies       Review of Systems   Constitutional: Negative for activity change, appetite change, fatigue and unexpected weight change  Eyes: Negative for visual disturbance  Respiratory: Negative for cough, chest tightness, shortness of breath and wheezing  Cardiovascular: Negative for chest pain, palpitations and leg swelling  Breast: Patient denies tenderness, nipple discharge, masses, or erythema  Gastrointestinal: Negative for abdominal distention, abdominal pain, blood in stool, constipation, diarrhea, nausea and vomiting  Endocrine: Negative for cold intolerance and heat intolerance  Genitourinary: Negative for decreased urine volume, difficulty urinating, dysuria, frequency, hematuria, menstrual problem, pelvic pain, urgency, vaginal bleeding, vaginal discharge and vaginal pain  Musculoskeletal: Negative for arthralgias  Skin: Negative for rash     Neurological: Negative for weakness, light-headedness, numbness and headaches  Hematological: Does not bruise/bleed easily  Psychiatric/Behavioral: Negative for agitation, behavioral problems and sleep disturbance  The patient is not nervous/anxious  Objective:    Vitals:    08/18/22 1043   BP: 118/74   BP Location: Right arm   Patient Position: Sitting   Cuff Size: Large   Weight: 106 kg (233 lb)   Height: 5' 4" (1 626 m)            Physical Exam  Vitals and nursing note reviewed  Constitutional:       General: She is not in acute distress  Appearance: She is well-developed  She is obese  HENT:      Head: Normocephalic and atraumatic  Eyes:      General: No scleral icterus  Right eye: No discharge  Left eye: No discharge  Extraocular Movements: Extraocular movements intact  Conjunctiva/sclera: Conjunctivae normal    Neck:      Thyroid: No thyromegaly  Trachea: No tracheal deviation  Cardiovascular:      Rate and Rhythm: Normal rate and regular rhythm  Heart sounds: Normal heart sounds  No murmur heard  Pulmonary:      Effort: Pulmonary effort is normal  No respiratory distress  Breath sounds: Normal breath sounds  No wheezing  Chest:   Breasts: Breasts are symmetrical       Right: No inverted nipple, mass, nipple discharge, skin change or tenderness  Left: No inverted nipple, mass, nipple discharge, skin change or tenderness  Abdominal:      General: Bowel sounds are normal  There is no distension  Palpations: Abdomen is soft  There is no mass  Tenderness: There is no abdominal tenderness  There is no guarding or rebound  Genitourinary:     General: Normal vulva  Labia:         Right: No rash, tenderness or lesion  Left: No rash, tenderness or lesion  Vagina: Normal       Cervix: No cervical motion tenderness or discharge  Uterus: Not deviated, not enlarged and not tender  Adnexa:         Right: No mass, tenderness or fullness            Left: No mass, tenderness or fullness  Rectum: No external hemorrhoid  Comments: Urethral meatus within normal limits  Perineum within normal limits  Bladder well supported  Currently menstruating at the appropriate time  Musculoskeletal:         General: No tenderness  Normal range of motion  Cervical back: Normal range of motion and neck supple  Lymphadenopathy:      Cervical: No cervical adenopathy  Skin:     General: Skin is warm and dry  Neurological:      Mental Status: She is alert and oriented to person, place, and time  Psychiatric:         Mood and Affect: Mood normal          Behavior: Behavior normal          Thought Content:  Thought content normal          Judgment: Judgment normal

## 2022-08-18 ENCOUNTER — ANNUAL EXAM (OUTPATIENT)
Dept: OBGYN CLINIC | Facility: CLINIC | Age: 22
End: 2022-08-18
Payer: COMMERCIAL

## 2022-08-18 VITALS
DIASTOLIC BLOOD PRESSURE: 74 MMHG | HEIGHT: 64 IN | WEIGHT: 233 LBS | BODY MASS INDEX: 39.78 KG/M2 | SYSTOLIC BLOOD PRESSURE: 118 MMHG

## 2022-08-18 DIAGNOSIS — Z30.41 ORAL CONTRACEPTIVE USE: ICD-10-CM

## 2022-08-18 DIAGNOSIS — Z01.419 ENCOUNTER FOR ANNUAL ROUTINE GYNECOLOGICAL EXAMINATION: Primary | ICD-10-CM

## 2022-08-18 DIAGNOSIS — N92.1 MENORRHAGIA WITH IRREGULAR CYCLE: ICD-10-CM

## 2022-08-18 PROCEDURE — 99395 PREV VISIT EST AGE 18-39: CPT | Performed by: OBSTETRICS & GYNECOLOGY

## 2022-08-18 PROCEDURE — 0503F POSTPARTUM CARE VISIT: CPT | Performed by: OBSTETRICS & GYNECOLOGY

## 2022-08-18 RX ORDER — LEVONORGESTREL AND ETHINYL ESTRADIOL 0.1-0.02MG
1 KIT ORAL DAILY
Qty: 84 TABLET | Refills: 3 | Status: SHIPPED | OUTPATIENT
Start: 2022-08-18

## 2022-08-22 ENCOUNTER — TELEPHONE (OUTPATIENT)
Dept: OBGYN CLINIC | Facility: CLINIC | Age: 22
End: 2022-08-22

## 2022-08-22 NOTE — TELEPHONE ENCOUNTER
Pt wants her oc's to be resent to the [facility]  Lina Chaudhary;; her insurance changed - we can take out all the other pharmacies on her chart

## 2023-08-01 DIAGNOSIS — F41.8 DEPRESSION WITH ANXIETY: ICD-10-CM

## 2023-08-02 RX ORDER — SERTRALINE HYDROCHLORIDE 100 MG/1
TABLET, FILM COATED ORAL
Qty: 30 TABLET | Refills: 0 | Status: SHIPPED | OUTPATIENT
Start: 2023-08-02 | End: 2023-08-17 | Stop reason: SDUPTHER

## 2023-08-02 RX ORDER — SERTRALINE HYDROCHLORIDE 25 MG/1
TABLET, FILM COATED ORAL
Qty: 30 TABLET | Refills: 0 | Status: SHIPPED | OUTPATIENT
Start: 2023-08-02 | End: 2023-08-17 | Stop reason: SDUPTHER

## 2023-08-17 ENCOUNTER — OFFICE VISIT (OUTPATIENT)
Dept: FAMILY MEDICINE CLINIC | Facility: CLINIC | Age: 23
End: 2023-08-17
Payer: COMMERCIAL

## 2023-08-17 VITALS
BODY MASS INDEX: 39.21 KG/M2 | OXYGEN SATURATION: 98 % | HEIGHT: 64 IN | TEMPERATURE: 98.6 F | WEIGHT: 229.7 LBS | SYSTOLIC BLOOD PRESSURE: 112 MMHG | DIASTOLIC BLOOD PRESSURE: 66 MMHG | HEART RATE: 66 BPM | RESPIRATION RATE: 14 BRPM

## 2023-08-17 DIAGNOSIS — Z13.228 SCREENING FOR METABOLIC DISORDER: ICD-10-CM

## 2023-08-17 DIAGNOSIS — E55.9 VITAMIN D DEFICIENCY: ICD-10-CM

## 2023-08-17 DIAGNOSIS — Z13.29 SCREENING FOR THYROID DISORDER: ICD-10-CM

## 2023-08-17 DIAGNOSIS — F41.8 DEPRESSION WITH ANXIETY: ICD-10-CM

## 2023-08-17 DIAGNOSIS — Z00.00 ANNUAL PHYSICAL EXAM: Primary | ICD-10-CM

## 2023-08-17 DIAGNOSIS — Z13.6 SCREENING FOR CARDIOVASCULAR CONDITION: ICD-10-CM

## 2023-08-17 DIAGNOSIS — R68.89 FREQUENTLY SICK: ICD-10-CM

## 2023-08-17 DIAGNOSIS — L30.9 ECZEMA, UNSPECIFIED TYPE: ICD-10-CM

## 2023-08-17 PROCEDURE — 99395 PREV VISIT EST AGE 18-39: CPT | Performed by: NURSE PRACTITIONER

## 2023-08-17 PROCEDURE — 99213 OFFICE O/P EST LOW 20 MIN: CPT | Performed by: NURSE PRACTITIONER

## 2023-08-17 RX ORDER — SERTRALINE HYDROCHLORIDE 25 MG/1
25 TABLET, FILM COATED ORAL DAILY
Qty: 90 TABLET | Refills: 3 | Status: SHIPPED | OUTPATIENT
Start: 2023-08-17

## 2023-08-17 RX ORDER — SERTRALINE HYDROCHLORIDE 100 MG/1
100 TABLET, FILM COATED ORAL DAILY
Qty: 90 TABLET | Refills: 3 | Status: SHIPPED | OUTPATIENT
Start: 2023-08-17

## 2023-08-17 RX ORDER — BETAMETHASONE DIPROPIONATE 0.5 MG/G
CREAM TOPICAL 2 TIMES DAILY
Qty: 45 G | Refills: 2 | Status: SHIPPED | OUTPATIENT
Start: 2023-08-17

## 2023-08-17 NOTE — PROGRESS NOTES
ADULT ANNUAL PHYSICAL  [id number] [facility]    NAME: Jayjay Shah  AGE: 21 y.o. SEX: female  : 2000     DATE: 2023     Assessment and Plan:  1. Immunizations UTD. Pt notes she had TDAP in 2020 and will try to get our office the exact date. 2. Fasting labs ordered. 3. PAP through GYN. 4. F/u in 1 year for annual physical or sooner PRN. Problem List Items Addressed This Visit        Other    BMI 39.0-39.9,adult    Relevant Orders    Lipid panel    Hemoglobin A1C    Ambulatory Referral to Weight Management   Other Visit Diagnoses     Annual physical exam    -  Primary    Depression with anxiety        Relevant Medications    sertraline (ZOLOFT) 100 mg tablet    sertraline (ZOLOFT) 25 mg tablet    Frequently sick        Relevant Orders    IgA    Eczema, unspecified type        Relevant Medications    betamethasone dipropionate (DIPROSONE) 0.05 % cream    Screening for cardiovascular condition        Relevant Orders    CBC and differential    Screening for metabolic disorder        Relevant Orders    Comprehensive metabolic panel    Screening for thyroid disorder        Relevant Orders    TSH, 3rd generation with Free T4 reflex    Vitamin D deficiency        Relevant Orders    Vitamin D 25 hydroxy          Immunizations and preventive care screenings were discussed with patient today. Appropriate education was printed on patient's after visit summary. Counseling:  Alcohol/drug use: discussed moderation in alcohol intake, the recommendations for healthy alcohol use, and avoidance of illicit drug use. Dental Health: discussed importance of regular tooth brushing, flossing, and dental visits. Injury prevention: discussed safety/seat belts, safety helmets, smoke detectors, carbon dioxide detectors, and smoking near bedding or upholstery.   Sexual health: discussed sexually transmitted diseases, partner selection, use of condoms, avoidance of unintended pregnancy, and contraceptive alternatives. · Exercise: the importance of regular exercise/physical activity was discussed. Recommend exercise 3-5 times per week for at least 30 minutes. BMI Counseling: Body mass index is 39.43 kg/m². The BMI is above normal. Nutrition recommendations include decreasing portion sizes, encouraging healthy choices of fruits and vegetables, decreasing fast food intake, consuming healthier snacks, limiting drinks that contain sugar, moderation in carbohydrate intake, increasing intake of lean protein, reducing intake of saturated and trans fat and reducing intake of cholesterol. Exercise recommendations include moderate physical activity 150 minutes/week. No pharmacotherapy was ordered. Rationale for BMI follow-up plan is due to patient being overweight or obese. Depression Screening and Follow-up Plan: Patient was screened for depression during today's encounter. They screened negative with a PHQ-2 score of 1. Return in about 1 year (around 8/17/2024) for Annual physical.     Chief Complaint:     Chief Complaint   Patient presents with   • Medication Management   • Physical Exam      History of Present Illness:     Adult Annual Physical   Patient here for a comprehensive physical exam. The patient reports no problems. Diet and Physical Activity  · Diet/Nutrition: poor diet. · Exercise: no formal exercise. Depression Screening  PHQ-2/9 Depression Screening    Little interest or pleasure in doing things: 1 - several days  Feeling down, depressed, or hopeless: 0 - not at all  PHQ-2 Score: 1  PHQ-2 Interpretation: Negative depression screen       General Health  · Sleep: sleeps well and gets 7-8 hours of sleep on average. · Hearing: normal - bilateral.  · Vision: no vision problems, most recent eye exam <1 year ago and wears contacts. · Dental: regular dental visits, brushes teeth twice daily and flosses teeth occasionally. /GYN Health  · Last menstrual period: 07/17/2023  · Contraceptive method: oral contraceptives. · History of STDs?: no.     Review of Systems:     Review of Systems   Constitutional: Negative. Negative for chills and fatigue. HENT: Negative. Respiratory: Negative. Negative for cough and shortness of breath. Cardiovascular: Negative. Negative for chest pain. Gastrointestinal: Negative. Genitourinary: Negative. Musculoskeletal: Negative. Negative for myalgias. Neurological: Negative.        Past Medical History:     Past Medical History:   Diagnosis Date   • Anxiety    • Depression    • IBS (irritable bowel syndrome)       Past Surgical History:     Past Surgical History:   Procedure Laterality Date   • ADENOIDECTOMY      Last assessed 6/14/2014    • BREAST SURGERY  03/16/2021    breast reduction   • WISDOM TOOTH EXTRACTION        Social History:     Social History     Socioeconomic History   • Marital status: Single     Spouse name: None   • Number of children: None   • Years of education: None   • Highest education level: None   Occupational History   • None   Tobacco Use   • Smoking status: Never   • Smokeless tobacco: Never   Vaping Use   • Vaping Use: Never used   Substance and Sexual Activity   • Alcohol use: Yes     Comment: Rare   • Drug use: Never   • Sexual activity: Never   Other Topics Concern   • None   Social History Narrative    Student: 12th grade, [employer]      Social Determinants of Health     Financial Resource Strain: Not on file   Food Insecurity: Not on file   Transportation Needs: Not on file   Physical Activity: Not on file   Stress: Not on file   Social Connections: Not on file   Intimate Partner Violence: Not on file   Housing Stability: Not on file      Family History:     Family History   Problem Relation Age of Onset   • Depression Mother    • Hypertension Mother    • Hyperlipidemia Father    • No Known Problems Sister    • Hypertension Maternal Grandmother • Depression Maternal Grandmother    • Prostate cancer Maternal Grandfather    • Other Maternal Grandfather         IGA deficiency   • Prostate cancer Paternal Grandfather    • Depression Maternal Aunt    • Alcohol abuse Neg Hx    • Substance Abuse Neg Hx    • Breast cancer Neg Hx    • Ovarian cancer Neg Hx       Current Medications:     Current Outpatient Medications   Medication Sig Dispense Refill   • betamethasone dipropionate (DIPROSONE) 0.05 % cream Apply topically 2 (two) times a day 45 g 2   • levonorgestrel-ethinyl estradiol (AVIANE,ALESSE,LESSINA) 0.1-20 MG-MCG per tablet Take 1 tablet by mouth daily 84 tablet 3   • sertraline (ZOLOFT) 100 mg tablet Take 1 tablet (100 mg total) by mouth daily 90 tablet 3   • sertraline (ZOLOFT) 25 mg tablet Take 1 tablet (25 mg total) by mouth daily 90 tablet 3     No current facility-administered medications for this visit. Allergies:     No Known Allergies   Physical Exam:     /66   Pulse 66   Temp 98.6 °F (37 °C) (Oral)   Resp 14   Ht 5' 4" (1.626 m)   Wt 104 kg (229 lb 11.2 oz)   SpO2 98%   BMI 39.43 kg/m²     Physical Exam  Vitals and nursing note reviewed. Constitutional:       General: She is not in acute distress. Appearance: Normal appearance. She is well-developed. She is not ill-appearing. HENT:      Head: Normocephalic and atraumatic. Right Ear: Tympanic membrane, ear canal and external ear normal.      Left Ear: Tympanic membrane, ear canal and external ear normal.   Eyes:      Conjunctiva/sclera: Conjunctivae normal.   Neck:      Vascular: No carotid bruit. Cardiovascular:      Rate and Rhythm: Normal rate and regular rhythm. Pulses: Normal pulses. Heart sounds: Normal heart sounds. No murmur heard. Pulmonary:      Effort: Pulmonary effort is normal. No respiratory distress. Breath sounds: Normal breath sounds. No wheezing. Abdominal:      General: There is no distension. Palpations: Abdomen is soft. There is no mass. Tenderness: There is no abdominal tenderness. There is no guarding or rebound. Hernia: No hernia is present. Musculoskeletal:         General: Normal range of motion. Cervical back: Normal range of motion and neck supple. Right lower leg: No edema. Left lower leg: No edema. Skin:     General: Skin is warm and dry. Capillary Refill: Capillary refill takes less than 2 seconds. Neurological:      Mental Status: She is alert and oriented to person, place, and time. Mental status is at baseline. Motor: No weakness. Gait: Gait normal.   Psychiatric:         Mood and Affect: Mood normal.         Behavior: Behavior normal.         Thought Content:  Thought content normal.         Judgment: Judgment normal.          Nikolay Wharton

## 2023-08-17 NOTE — PROGRESS NOTES
Assessment/Plan:     Diagnoses and all orders for this visit:    Depression with anxiety  -     sertraline (ZOLOFT) 100 mg tablet; Take 1 tablet (100 mg total) by mouth daily  -     sertraline (ZOLOFT) 25 mg tablet; Take 1 tablet (25 mg total) by mouth daily    Managed w/ Sertraline 125 mg daily. Continue same. Frequently sick  -     IgA; Future    Pt w/ MGF with IgA deficiency. Lab ordered per pt request.    Eczema, unspecified type  -     betamethasone dipropionate (DIPROSONE) 0.05 % cream; Apply topically 2 (two) times a day    Refills prescribed. Screening for cardiovascular condition  -     CBC and differential; Future    Screening for metabolic disorder  -     Comprehensive metabolic panel; Future    BMI 39.0-39.9,adult  -     Lipid panel; Future  -     Hemoglobin A1C; Future  -     Ambulatory Referral to Weight Management; Future    Pt to continue good diet and exercise. Weight mgmt referral placed. Screening for thyroid disorder  -     TSH, 3rd generation with Free T4 reflex; Future    Vitamin D deficiency  -     Vitamin D 25 hydroxy; Future            Pt to f/u in 1 year for annual physical or sooner PRN. F/u pending results. Subjective:      Patient ID: Cindie Seip is a 21 y.o. female. Pt here today for her annual physical and med recheck of Sertraline. She notes overall she is doing okay. She thought about decreasing her Sertraline, however about 1 month ago she found out that her parents were getting a divorce and feels that she would like to stay on the dosing of 125 mg for now. Pt also notes some frustration with not being able to lose weight. She has not been on her normal diet and exercise routine, however just started back. Pt also notes her grandfather had hx of IgA deficiency and she would like to be screened as she is frequently sick.       The following portions of the patient's history were reviewed and updated as appropriate: allergies, current medications, past family history, past medical history, past social history, past surgical history and problem list.    Review of Systems    As noted per HPI. Objective:      /66   Pulse 66   Temp 98.6 °F (37 °C) (Oral)   Resp 14   Ht 5' 4" (1.626 m)   Wt 104 kg (229 lb 11.2 oz)   SpO2 98%   BMI 39.43 kg/m²          Physical Exam  Vitals reviewed. Constitutional:       Appearance: Normal appearance. HENT:      Head: Normocephalic. Right Ear: Tympanic membrane, ear canal and external ear normal.      Left Ear: Tympanic membrane, ear canal and external ear normal.   Cardiovascular:      Rate and Rhythm: Normal rate and regular rhythm. Pulses: Normal pulses. Heart sounds: Normal heart sounds. Pulmonary:      Effort: Pulmonary effort is normal.      Breath sounds: Normal breath sounds. Abdominal:      General: Bowel sounds are normal.      Palpations: Abdomen is soft. Musculoskeletal:         General: Normal range of motion. Skin:     General: Skin is warm and dry. Neurological:      Mental Status: She is alert and oriented to person, place, and time. Mental status is at baseline.    Psychiatric:         Mood and Affect: Mood normal.         Behavior: Behavior normal.

## 2023-09-07 LAB — HBA1C MFR BLD HPLC: 5.4 %

## 2023-09-18 ENCOUNTER — OFFICE VISIT (OUTPATIENT)
Dept: BARIATRICS | Facility: CLINIC | Age: 23
End: 2023-09-18
Payer: COMMERCIAL

## 2023-09-18 VITALS — HEIGHT: 64 IN | BODY MASS INDEX: 39.03 KG/M2 | WEIGHT: 228.6 LBS

## 2023-09-18 DIAGNOSIS — R63.5 ABNORMAL WEIGHT GAIN: ICD-10-CM

## 2023-09-18 PROCEDURE — WMDI30

## 2023-09-18 PROCEDURE — RECHECK

## 2023-09-18 NOTE — PROGRESS NOTES
Weight Management Medical Nutrition Assessment    Pt presents today for menu planning with RD. She reports she has been struggling with her weight and believes some of it can be related to some of the medications she is taking. Reviewed pt's diet recall and she appears to be making some healthier choices, but there are times she reports carbs are her weakness and her portions are larger. Did some menu planning with pt, focusing on portions. Also reviewed portions/exchanges and provided handout for reference. Reviewed different program options and asked if interested in meeting with provider. Pt is not interested in meeting with medical provider at this time, but does want to f/u with RD. Patient seen by Medical Provider in past 6 months:  no  Requested to schedule appointment with Medical Provider: No    Anthropometric Measurements  Start Weight (#): 228.6# (9/18/23)  Current Weight (#): 228.6#  TBW % Change from start weight:-  Ideal Body Weight (#):120#  Goal Weight (#): 180#  Highest:  now  Lowest:    Weight Loss History  Previous weight loss attempts: Commercial Programs ([employer], Belynda Spurr, etc.)  Exercise  Self Created Diets (Portion Control, Healthy Food Choices, etc.)  Lost on average 10-15lbs    Food and Nutrition Related History  Graduated from college in Dec, looking for a job in media  Did have a [employer] [address] job, but the family moved in August so kristen't have much of a routine     Wake up: varies, average 9-10am   Bed Time:10:30-11pm    Food Recall  Breakfast:  30-60mins after wakes up.   Eggs (1-2 eggs, sometimes mixed with cottage cheese) w/toast, avocado, fruit or cottage cheese or yogurt or smoothie  Drinks water or unsweet tea    Snack:-  Lunch:12pm:  Ridge (turkey w/provaolone on gluten free breadd)+ fruit or salad (grilled chicken with apple and dried cranberries and veggies w/ranch or balsamic vinegar dressing) or leftovers   Snack: cheese and crackers OR popcorn   Dinner: 4:30-5pm or later at 7pm:  Vegetarian chili or roasted chicken + sweet potato + salad OR eats out OR frozen pizza OR cheese and crackers--whatever is there if they haven't gone food shopping   Snack: sometimes cheese and crackers if hungrier or sweet snack  *weakness: pasta/carbs and portions    Beverages: water and sugar free beverages  Volume of beverage intake: 32oz water per day, occasional unsweet tea    Weekends: Worse, more eating out  Cravings: sweets/carbs  Trouble area of day:sweet after lunch and dinner or carbs when not full enough    Frequency of Eating out: 2x/week  Food restrictions: Has IBS--is trying to follow gluten and lactose free for the past 4-5 months, and trying low FOBMAP diet  Cooking: mostly mom, but she does at times  Food Shopping: self + mom    Vitamins:  None at this time    Physical Activity Intake  Activity:on a good week 3-4 times per week, bad week no exercise. When does exercise will do the gym and lift weights and cardio  Frequency:varies  Physical limitations/barriers to exercise: -    Estimated Needs  Energy  Bear [employer] Energy Needs: BMR : 3813     1-2# loss weekly sedentary:  1132-1632cals               1-2# loss weekly lightly active:1119-4108  Maintenance calories for sedentary activity level: 2129  Protein: 65-80      (1.2-1.5g/kg IBW)  Fluid: 1636-1909ml     (35mL/kg IBW)    Nutrition Diagnosis  Yes; Overweight/obesity  related to Excess energy intake as evidenced by  BMI more than normative standard for age and sex (obesity-grade II 35-39. 9)       Nutrition Intervention    Nutrition Prescription  Calories:1300-1400cals  Protein:65-80gm   Fluid:1600-1900ml    Meal Plan (Bruce/Pro/Carb)  Breakfast:  250-300  Snack:  150  Lunch:  300  Snack: 150  Dinner: 450-500  Snack: optional 100 cals snack    Nutrition Education:    Healthy Core Manual  Calorie controlled menu  Lean protein food choices  Healthy snack options  Food journaling tips      Nutrition Counseling:  Strategies: meal planning, portion sizes, healthy snack choices, hydration, fiber intake, protein intake, exercise, food journal      Monitoring and Evaluation:  Evaluation criteria:  Energy Intake  Meet protein needs  Maintain adequate hydration  Monitor weekly weight  Meal planning/preparation  Food journal   Decreased portions at mealtimes and snacks  Physical activity     Barriers to learning:none  Readiness to change: Preparation:  (Getting ready to change)   Comprehension: good  Expected Compliance: good

## 2023-10-02 ENCOUNTER — ANNUAL EXAM (OUTPATIENT)
Dept: OBGYN CLINIC | Facility: CLINIC | Age: 23
End: 2023-10-02
Payer: COMMERCIAL

## 2023-10-02 VITALS
WEIGHT: 229.2 LBS | BODY MASS INDEX: 39.13 KG/M2 | DIASTOLIC BLOOD PRESSURE: 58 MMHG | SYSTOLIC BLOOD PRESSURE: 92 MMHG | HEIGHT: 64 IN

## 2023-10-02 DIAGNOSIS — Z30.41 ORAL CONTRACEPTIVE USE: ICD-10-CM

## 2023-10-02 DIAGNOSIS — Z01.419 ENCNTR FOR GYN EXAM (GENERAL) (ROUTINE) W/O ABN FINDINGS: Primary | ICD-10-CM

## 2023-10-02 DIAGNOSIS — M62.89 PELVIC FLOOR DYSFUNCTION IN FEMALE: ICD-10-CM

## 2023-10-02 PROCEDURE — 99395 PREV VISIT EST AGE 18-39: CPT | Performed by: NURSE PRACTITIONER

## 2023-10-02 RX ORDER — LEVONORGESTREL AND ETHINYL ESTRADIOL 0.1-0.02MG
1 KIT ORAL DAILY
Qty: 84 TABLET | Refills: 3 | Status: SHIPPED | OUTPATIENT
Start: 2023-10-02

## 2023-10-02 NOTE — PATIENT INSTRUCTIONS
Pap every 3 years if normal.Due 2024  STI testing as indicated. N/A  Exercise most days of week-minimum of 150 minutes per week. Obtain appropriate diet and hydration. Calcium 1000 mg (in divided doses-max 600 mg at one time) + 600 vit D daily. Birth control refilled as requested and sent to pharmacy on file. Take as directed (ACHES reviewed). Benefits, risks and alternatives discussed/reviewed. Condom use when sexually active for sexually transmitted infection prevention. HPV 9 vaccine series completed. Referred to pelvic floor PT for possible dilator use/instruction. Annual mammogram starting at age 36, monthly breast self exam recommended. Return to office in one year or sooner, if needed.

## 2023-10-02 NOTE — PROGRESS NOTES
Assessment/Plan:  Pap every 3 years if normal.Due 2024  STI testing as indicated. N/A  Exercise most days of week-minimum of 150 minutes per week. Obtain appropriate diet and hydration. Calcium 1000 mg (in divided doses-max 600 mg at one time) + 600 vit D daily. Birth control refilled as requested and sent to pharmacy on file. Take as directed (ACHES reviewed). Benefits, risks and alternatives discussed/reviewed. Condom use when sexually active for sexually transmitted infection prevention. HPV 9 vaccine series completed. Referred to pelvic floor PT for possible dilator use/instruction. Annual mammogram starting at age 36, monthly breast self exam recommended. Return to office in one year or sooner, if needed. 1. Encntr for gyn exam (general) (routine) w/o abn findings    2. Oral contraceptive use  -     levonorgestrel-ethinyl estradiol (AVIANE,ALESSE,LESSINA) 0.1-20 MG-MCG per tablet; Take 1 tablet by mouth daily    3. Pelvic floor dysfunction in female  -     Ambulatory referral to Physical Therapy; Future    4. BMI 39.0-39.9,adult               Subjective:      Patient ID: Dimple Arora is a 21 y.o. female. HPI    Dimple Arora is a 21 y.o. [address] [facility]  female who is here today for her annual visit. No gynecologic health concerns. Monthly menses x 2-5 days with light to mod flow. Sometimes her mesnes runs late. Menses is acceptable. Uses pads and states tampon use is painful. She verbalized concern that her vaginal opening is too small. Exercise- 3-4 times per week. Works-not currently. 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 Looking for a job in [employer] in [employer] [address] [employer] or creative marketing. Lives at home with mom. Her father left in July and told her mom he wants a divorce. He now lives in [place]. Jina Koo refuses to have contact with him. Dimple Arora has never been sexually active and is not currently dating. She uses lessina for menses regulation.     She is not interested in STD screening today. She denies vaginal discharge, itching or pelvic pain. She has no urinary concerns, does not have incontinence. No bowel concerns. No breast concerns. Last pap: 8/16/21 normal  DEXA scan:   Mammogram: Not on file   Colonoscopy: Not on file  HPV vaccine series:Completed    Family history of cancer:   Cancer-related family history includes Prostate cancer in her maternal grandfather and paternal grandfather. There is no history of Breast cancer or Ovarian cancer. The following portions of the patient's history were reviewed and updated as appropriate: allergies, current medications, past family history, past medical history, past social history, past surgical history and problem list.    Review of Systems   Constitutional: Negative. Negative for activity change, appetite change, chills, diaphoresis, fatigue, fever and unexpected weight change. HENT: Negative for congestion, dental problem, sneezing, sore throat and trouble swallowing. Eyes: Negative for visual disturbance. Respiratory: Negative for chest tightness and shortness of breath. Cardiovascular: Negative for chest pain and leg swelling. Gastrointestinal: Negative for abdominal pain, constipation, diarrhea, nausea and vomiting. Genitourinary: Negative for difficulty urinating, dysuria, frequency, hematuria, menstrual problem, pelvic pain, urgency, vaginal bleeding, vaginal discharge and vaginal pain. Musculoskeletal: Negative for back pain and neck pain. Skin: Negative. Allergic/Immunologic: Negative. Neurological: Negative for weakness and headaches. Hematological: Negative for adenopathy. Psychiatric/Behavioral: Negative. Objective:      BP 92/58 (BP Location: Left arm, Patient Position: Sitting, Cuff Size: Large)   Ht 5' 3.5" (1.613 m)   Wt 104 kg (229 lb 3.2 oz)   LMP 09/11/2023 (Approximate)   BMI 39.96 kg/m²          Physical Exam  Vitals and nursing note reviewed. Constitutional:       Appearance: Normal appearance. She is well-developed. HENT:      Head: Normocephalic and atraumatic. Eyes:      General:         Right eye: No discharge. Left eye: No discharge. Neck:      Thyroid: No thyromegaly. Trachea: Trachea normal.   Cardiovascular:      Rate and Rhythm: Normal rate and regular rhythm. Heart sounds: Normal heart sounds. Pulmonary:      Effort: Pulmonary effort is normal.      Breath sounds: Normal breath sounds. Chest:   Breasts:     Breasts are symmetrical.      Right: Normal. No inverted nipple, mass, nipple discharge, skin change or tenderness. Left: Normal. No inverted nipple, mass, nipple discharge, skin change or tenderness. Comments: Breast reduction scars noted bilaterally. Abdominal:      Palpations: Abdomen is soft. Genitourinary:     General: Normal vulva. Exam position: Lithotomy position. Labia:         Right: No rash, tenderness, lesion or injury. Left: No rash, tenderness, lesion or injury. Urethra: No prolapse, urethral pain, urethral swelling or urethral lesion. Rectum: No external hemorrhoid. Musculoskeletal:         General: Normal range of motion. Cervical back: Normal range of motion and neck supple. Lymphadenopathy:      Head:      Right side of head: No submental, submandibular or tonsillar adenopathy. Left side of head: No submental, submandibular or tonsillar adenopathy. Cervical: No cervical adenopathy. Upper Body:      Right upper body: No supraclavicular or axillary adenopathy. Left upper body: No supraclavicular or axillary adenopathy. Lower Body: No right inguinal adenopathy. No left inguinal adenopathy. Skin:     General: Skin is warm and dry. Neurological:      Mental Status: She is alert and oriented to person, place, and time.    Psychiatric:         Mood and Affect: Mood normal.         Behavior: Behavior normal. vaginal exam deferred today.

## 2023-10-06 NOTE — PROGRESS NOTES
Weight Management Medical Nutrition Assessment  Jason Calderón is here today for 1/3 RD Bundle. Current Weight: 230.3#. Patient has gained 1.7# x 3 weeks. She reports she is struggling with dining out and consistency. Reviewed dining out handout, techniques to promote more balance at meals away from home and discussed benefits/techniques of identifying physical vs emotional hunger. Reviewed motivators for weight loss and disadvantage of being to 'strict' or restrictive. Recommend patient meet with SW. Appt scheduled. Patient feels calorie counting is not an option for her. Discussed benefits of portion control and balanced snacks to prevent excessive hunger. Will consider food logging in the future. Encouraged patient to increase daily activity (walking) by 10 mins with eventual goal of 30 mins 5x/week. Discussed benefits of establishing new routine for herself and setting daily intentions. Will f/u x 3 weeks for body comp testing.     Patient seen by Medical Provider in past 6 months:  no  Requested to schedule appointment with Medical Provider: No    Anthropometric Measurements  Start Weight (#): 228.6# (9/18/23)  Current Weight (#): 230.3#  TBW % Change from start weight: --%  Ideal Body Weight (#):121# (64.2")  Goal Weight (#): 180#    Weight Loss History  Previous weight loss attempts: Commercial Programs (WhatSalon/Beststudy, Eyeonix, etc.)  Exercise, Self Created Diets (Portion Control, Healthy Food Choices, etc.)  On average lost 10-15# with these methods    Food and Nutrition Related History  Wake up: 9-10 am   Bed Time: 10-11 pm  Sleep quality: well    Dietary Recall  Breakfast (9-11 am): 1 egg + 2 egg whites w/ 2 slices GF toast + 1/2 avocado + fruit Or cottage cheese Or smoothie  Snack: --  Lunch (12 pm): leftovers Or sandwich (turkey w/ cheese, GF bread) w/ fruit Or salad w/ chicken, apple, cranberries and ranch or vinegar dressing   Snack: laughing cow w/ nut thins + apple Or cheese and crackers Or popcorn Or ensure Max   Dinner (4-7 pm): vegetarian chili Or chicken/turkey + veggie + carb Or dining out Or frozen pizza Or whatever is there if they haven't gone food shopping  Snack: chocolate chips Or mini ice cream bars Or sweets [feels like she may 'ruin a good day' here]    Beverages: water and unsweetened tea  Volume of beverage intake: 32 oz water     Weekends: more dining out   Cravings: carbs/pasta/sweets  Trouble area of day: between or after meals     Frequency of Eating out: 2x/week  Food restrictions: IBS- trying to follow GF lactose free diet for past 4-5 moths, avoiding FODMAP foods  Cooking: mostly mom, occasionally self  Food Shopping: self and mom     Physical Activity  Activity: No formal routine but may incorporate strength training + cardio at the gym   Frequency:0-3x/week  Physical limitations/barriers to exercise: --    Estimated Needs  Energy  SECA: BMR: -- X 1.4 -1,000 = -- kcal     [employer] Energy Needs (needs at 228.6#)  BMR: 1,780 kcal  Maintenance calories (sedentary): 2,136 kcal  1-2#/week loss (sedentary): 1,138-0,705 kcal  1-2#/week loss (light activity): 1,448-1,948 kcal    Protein: 66-83 grams (1.2-1.5g/kg IBW)  Fluid: 64 ounces (35mL/kg IBW)    Nutrition Diagnosis  Yes; Overweight/obesity related to Excess energy intake as evidenced by BMI more than normative standard for age and sex (obesity-grade II 35-39. 9)     Nutrition Intervention    Nutrition Prescription  Calories: 1,300-1,400 kcal  Protein: 66-83 g  Fluid: 64+ ounces    Meal Plan (Bruce/Pro)  Breakfast: 250-300/15-20  Snack: --  Lunch: 300/25  Snack: 100-150/0-5+  Dinner: 450-500/30  Snack: 100-150/0-5+    Nutrition Education  Calorie controlled menu  Lean protein food choices  Healthy snack options  Food journaling tips    Nutrition Counseling  Strategies: meal planning, portion sizes, healthy snack choices, hydration, fiber intake, protein intake, exercise, food logging    Monitoring and Evaluation:    Evaluation criteria  Energy Intake  Meet protein needs  Maintain adequate hydration  Monitor weekly weight  Meal planning/preparation  Food journal   Decreased portions at mealtimes and snacks  Physical activity     Barriers to learning:none  Readiness to change: Preparation:  (Getting ready to change)   Comprehension: good  Expected Compliance: good

## 2023-10-09 ENCOUNTER — OFFICE VISIT (OUTPATIENT)
Dept: BARIATRICS | Facility: CLINIC | Age: 23
End: 2023-10-09

## 2023-10-09 VITALS — WEIGHT: 230.3 LBS | HEIGHT: 64 IN | BODY MASS INDEX: 39.32 KG/M2

## 2023-10-09 DIAGNOSIS — R63.5 ABNORMAL WEIGHT GAIN: Primary | ICD-10-CM

## 2023-10-09 PROCEDURE — DB3PK

## 2023-10-09 PROCEDURE — RECHECK

## 2023-10-26 ENCOUNTER — OFFICE VISIT (OUTPATIENT)
Dept: BARIATRICS | Facility: CLINIC | Age: 23
End: 2023-10-26

## 2023-10-26 VITALS — WEIGHT: 228.8 LBS | BODY MASS INDEX: 39.03 KG/M2

## 2023-10-26 DIAGNOSIS — R63.5 ABNORMAL WEIGHT GAIN: Primary | ICD-10-CM

## 2023-10-26 PROCEDURE — RECHECK

## 2023-10-26 NOTE — PROGRESS NOTES
Patient presents for 1 hour Behavioral Health Evaluation as a part of Medical Weight Management Program, current weight 228.8lbs. Patient reports struggles with emotional eating, she will snack later in the evening or when she's feeling stressed. She reports times when she "doesn't feel like eating healthy" and that there is a lot of effort that goes into preparing and healthy foods. Explored feelings that trigger desire to eat, likely boredom since she graduated from college and still trying to start her career and feeling overwhelmed with job searching and not finding a suitable position yet. She has more time now than she did when in school, and her friends who were a major support system to her aren't as easily accessible as they were when she was living with them at school so she has not found a structured to her day which impacts her eating habits. Encouraged patient to tune into mindset when reaching for food, cue into times she's using the phrase "I don't feel like. .." and work on naming the feeling to identify the real problem. Suggested she work on stop, think, act to slow down the automatic response to want to snack and search for non-food coping skills to replace the eating behavior. Patient reported some difficulty navigating social occasions with friends when they go out to eat. Made some suggestions to plan ahead for nights out with friends, looking ahead at the menu, ordering first and asking for a box with her meal to portion her plate and take leftovers home. Patient worried she has binge eating disorder because she goes for second or third servings. Review diagnosis criteria for binge eating disorder and whether it impacts daily functioning, based on patient report she doesn't meet criteria.  Suggested she keep monitoring her eating behaviors, if they start to impact functioning she can check in but ultimately if her behaviors trend towards more healthy choices weight loss is likely to follow.     Next Appointment:  with RD on 10/30

## 2024-02-21 PROBLEM — Z01.419 ENCNTR FOR GYN EXAM (GENERAL) (ROUTINE) W/O ABN FINDINGS: Status: RESOLVED | Noted: 2019-05-09 | Resolved: 2024-02-21

## 2024-02-21 PROBLEM — Z01.419 ENCOUNTER FOR ANNUAL ROUTINE GYNECOLOGICAL EXAMINATION: Status: RESOLVED | Noted: 2022-08-17 | Resolved: 2024-02-21

## 2024-05-14 DIAGNOSIS — F41.8 DEPRESSION WITH ANXIETY: Primary | ICD-10-CM

## 2024-05-29 ENCOUNTER — TELEPHONE (OUTPATIENT)
Dept: PSYCHIATRY | Facility: CLINIC | Age: 24
End: 2024-05-29

## 2024-08-19 ENCOUNTER — TELEPHONE (OUTPATIENT)
Age: 24
End: 2024-08-19

## 2024-08-24 DIAGNOSIS — F41.8 DEPRESSION WITH ANXIETY: ICD-10-CM

## 2024-08-26 RX ORDER — SERTRALINE HYDROCHLORIDE 25 MG/1
25 TABLET, FILM COATED ORAL DAILY
Qty: 90 TABLET | Refills: 3 | Status: SHIPPED | OUTPATIENT
Start: 2024-08-26 | End: 2024-08-27 | Stop reason: SDUPTHER

## 2024-08-26 RX ORDER — SERTRALINE HYDROCHLORIDE 100 MG/1
100 TABLET, FILM COATED ORAL DAILY
Qty: 90 TABLET | Refills: 3 | Status: SHIPPED | OUTPATIENT
Start: 2024-08-26 | End: 2024-08-27 | Stop reason: SDUPTHER

## 2024-08-27 ENCOUNTER — OFFICE VISIT (OUTPATIENT)
Dept: FAMILY MEDICINE CLINIC | Facility: CLINIC | Age: 24
End: 2024-08-27
Payer: COMMERCIAL

## 2024-08-27 VITALS
TEMPERATURE: 96.9 F | RESPIRATION RATE: 14 BRPM | WEIGHT: 232.8 LBS | HEART RATE: 64 BPM | SYSTOLIC BLOOD PRESSURE: 120 MMHG | HEIGHT: 64 IN | DIASTOLIC BLOOD PRESSURE: 82 MMHG | BODY MASS INDEX: 39.75 KG/M2 | OXYGEN SATURATION: 99 %

## 2024-08-27 DIAGNOSIS — E55.9 VITAMIN D DEFICIENCY: ICD-10-CM

## 2024-08-27 DIAGNOSIS — Z13.228 SCREENING FOR METABOLIC DISORDER: ICD-10-CM

## 2024-08-27 DIAGNOSIS — Z13.29 SCREENING FOR THYROID DISORDER: ICD-10-CM

## 2024-08-27 DIAGNOSIS — Z13.6 SCREENING FOR CARDIOVASCULAR CONDITION: ICD-10-CM

## 2024-08-27 DIAGNOSIS — F41.8 DEPRESSION WITH ANXIETY: ICD-10-CM

## 2024-08-27 DIAGNOSIS — Z00.00 ANNUAL PHYSICAL EXAM: Primary | ICD-10-CM

## 2024-08-27 PROBLEM — K58.8 OTHER IRRITABLE BOWEL SYNDROME: Status: ACTIVE | Noted: 2024-08-27

## 2024-08-27 PROCEDURE — 99395 PREV VISIT EST AGE 18-39: CPT | Performed by: NURSE PRACTITIONER

## 2024-08-27 RX ORDER — SERTRALINE HYDROCHLORIDE 100 MG/1
100 TABLET, FILM COATED ORAL DAILY
Qty: 90 TABLET | Refills: 3 | Status: SHIPPED | OUTPATIENT
Start: 2024-08-27

## 2024-08-27 RX ORDER — SERTRALINE HYDROCHLORIDE 25 MG/1
25 TABLET, FILM COATED ORAL DAILY
Qty: 90 TABLET | Refills: 3 | Status: SHIPPED | OUTPATIENT
Start: 2024-08-27

## 2024-08-27 NOTE — PROGRESS NOTES
Adult Annual Physical  Name: Christiane Houston      : 2000      MRN: [id number]  Encounter Provider: BETHANIE Arciniega  Encounter Date: 2024   Encounter department: [facility]    Pt believes she had TDAP in .  Labs ordered.  PAP through GYN.  F/u in 1 year for annual physical or sooner PRN.    Assessment & Plan   1. Annual physical exam  2. Vitamin D deficiency  -     Vitamin D 25 hydroxy; Future  3. Screening for cardiovascular condition  -     CBC and differential; Future  4. Screening for metabolic disorder  -     Comprehensive metabolic panel; Future  5. Screening for thyroid disorder  -     TSH, 3rd generation with Free T4 reflex; Future  6. BMI 39.0-39.9,adult  -     Lipid panel; Future  7. Depression with anxiety  -     sertraline (ZOLOFT) 100 mg tablet; Take 1 tablet (100 mg total) by mouth daily  -     sertraline (ZOLOFT) 25 mg tablet; Take 1 tablet (25 mg total) by mouth daily    Immunizations and preventive care screenings were discussed with patient today. Appropriate education was printed on patient's after visit summary.    Counseling:  Alcohol/drug use: discussed moderation in alcohol intake, the recommendations for healthy alcohol use, and avoidance of illicit drug use.  Dental Health: discussed importance of regular tooth brushing, flossing, and dental visits.  Injury prevention: discussed safety/seat belts, safety helmets, smoke detectors, carbon dioxide detectors, and smoking near bedding or upholstery.  Sexual health: discussed sexually transmitted diseases, partner selection, use of condoms, avoidance of unintended pregnancy, and contraceptive alternatives.  Exercise: the importance of regular exercise/physical activity was discussed. Recommend exercise 3-5 times per week for at least 30 minutes.       Depression Screening and Follow-up Plan: Patient was screened for depression during today's encounter. They screened negative  "with a PHQ-2 score of 0.        History of Present Illness     Adult Annual Physical:  Patient presents for annual physical. Pt presents today for annual physical.  Overall she is feeling well..     Diet and Physical Activity:  - Diet/Nutrition: well balanced diet and consuming 3-5 servings of fruits/vegetables daily.  - Exercise: no formal exercise.    Depression Screening:  - PHQ-2 Score: 0    General Health:  - Sleep: > 8 hours of sleep on average and sleeps well.  - Hearing: normal hearing bilateral ears.  - Vision: goes for regular eye exams and no vision problems.  - Dental: brushes teeth twice daily and regular dental visits.    /GYN Health:  - Follows with GYN: yes.   - Menopause: premenopausal.   - Last menstrual cycle: 8/13/2024.     Review of Systems   Constitutional: Negative.  Negative for chills and fatigue.   HENT: Negative.     Respiratory: Negative.  Negative for cough and shortness of breath.    Cardiovascular: Negative.  Negative for chest pain.   Gastrointestinal: Negative.    Genitourinary: Negative.    Musculoskeletal: Negative.  Negative for myalgias.   Neurological: Negative.          Objective     /82   Pulse 64   Temp (!) 96.9 °F (36.1 °C)   Resp 14   Ht 5' 4.25\" (1.632 m)   Wt 106 kg (232 lb 12.8 oz)   SpO2 99%   BMI 39.65 kg/m²     Physical Exam  Vitals and nursing note reviewed.   Constitutional:       General: She is not in acute distress.     Appearance: Normal appearance. She is well-developed. She is not ill-appearing.   HENT:      Head: Normocephalic and atraumatic.   Eyes:      Conjunctiva/sclera: Conjunctivae normal.   Neck:      Vascular: No carotid bruit.   Cardiovascular:      Rate and Rhythm: Normal rate and regular rhythm.      Pulses: Normal pulses.      Heart sounds: Normal heart sounds. No murmur heard.  Pulmonary:      Effort: Pulmonary effort is normal. No respiratory distress.      Breath sounds: Normal breath sounds. No wheezing.   Abdominal:      " General: There is no distension.      Palpations: Abdomen is soft. There is no mass.      Tenderness: There is no abdominal tenderness. There is no guarding or rebound.      Hernia: No hernia is present.   Musculoskeletal:         General: Normal range of motion.      Cervical back: Normal range of motion and neck supple.      Right lower leg: No edema.      Left lower leg: No edema.   Skin:     General: Skin is warm and dry.      Capillary Refill: Capillary refill takes less than 2 seconds.   Neurological:      Mental Status: She is alert and oriented to person, place, and time. Mental status is at baseline.      Motor: No weakness.      Gait: Gait normal.   Psychiatric:         Mood and Affect: Mood normal.         Behavior: Behavior normal.         Thought Content: Thought content normal.         Judgment: Judgment normal.

## 2024-09-04 DIAGNOSIS — Z30.41 ORAL CONTRACEPTIVE USE: ICD-10-CM

## 2024-09-04 RX ORDER — TIMOLOL MALEATE 5 MG/ML
1 SOLUTION/ DROPS OPHTHALMIC DAILY
Qty: 84 TABLET | Refills: 0 | Status: SHIPPED | OUTPATIENT
Start: 2024-09-04

## 2024-10-04 ENCOUNTER — TELEPHONE (OUTPATIENT)
Age: 24
End: 2024-10-04

## 2024-10-06 NOTE — PROGRESS NOTES
Assessment/Plan:  Pap every 3 years if normal.Collected today.   STI testing as indicated.Declined.   Exercise most days of week-minimum of 150-300 minutes per week.   Obtain appropriate diet and hydration.   Calcium 1000 mg (in divided doses-max 600 mg at one time) + 600 vit D daily.  Birth control refilled as requested and sent to pharmacy on file. Take as directed (ACHES reviewed). Benefits, risks and alternatives discussed/reviewed.   Condom use when sexually active for sexually transmitted infection prevention.   HPV 9 vaccine series was completed  Annual mammogram starting at age 40, monthly breast self exam recommended.   Call your insurance company to verify coverage prior to completing any ordered tests.   Return to office in one year or sooner, if needed.        1. Encounter for gynecological examination  -     Liquid-based pap, screening  2. Oral contraceptive use  -     levonorgestrel-ethinyl estradiol (Vienva) 0.1-20 MG-MCG per tablet; Take 1 tablet by mouth daily             Subjective:      Patient ID: Christiane Houston is a 24 y.o. female.    HPI    Christiane Houston is a 24 y.o.  female who is here today for her annual visit. No gynecologic health concerns. Arrived just after appt start time.   Currently being treated for IBS-D.  Monthly menses x 4-5 days with mod flow. Menses is acceptable.  Exercise- Not regularly.   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   Lives at home with mom.   No contact with her father who left last year when  told her mom he wants a divorce.    Christiane Houston has never been sexually active and is not currently dating.   She uses [facility]ina for menses regulation.    She is not interested in STD screening today.   She denies vaginal discharge, itching or pelvic pain.   She has no urinary concerns, does not have incontinence.  No bowel concerns.  No breast concerns.    Last pap: 2021 normal   Mammogram: Not on file  "  Colonoscopy: Not on file  DEXA scan: Not on file  HPV vaccine series:completed    Family history of cancer:   Cancer-related family history includes Prostate cancer in her maternal grandfather and paternal grandfather. There is no history of Breast cancer or Ovarian cancer.  a    The following portions of the patient's history were reviewed and updated as appropriate: allergies, current medications, past family history, past medical history, past social history, past surgical history, and problem list.    Review of Systems   Constitutional: Negative.  Negative for activity change, appetite change, chills, diaphoresis, fatigue, fever and unexpected weight change.   HENT:  Negative for congestion, dental problem, sneezing, sore throat and trouble swallowing.    Eyes:  Negative for visual disturbance.   Respiratory:  Negative for chest tightness and shortness of breath.    Cardiovascular:  Negative for chest pain and leg swelling.   Gastrointestinal:  Negative for abdominal pain, constipation, diarrhea, nausea and vomiting.   Genitourinary:  Negative for difficulty urinating, dysuria, frequency, hematuria, menstrual problem, pelvic pain, urgency, vaginal bleeding, vaginal discharge and vaginal pain.   Musculoskeletal:  Negative for back pain and neck pain.   Skin: Negative.    Allergic/Immunologic: Negative.    Neurological:  Negative for weakness and headaches.   Hematological:  Negative for adenopathy.   Psychiatric/Behavioral: Negative.           Objective:      /84 (BP Location: Left arm, Patient Position: Sitting, Cuff Size: Large)   Ht 5' 3.78\" (1.62 m)   Wt 109 kg (240 lb 4.8 oz)   LMP 09/16/2024   BMI 41.53 kg/m²          Physical Exam  Vitals and nursing note reviewed.   Constitutional:       Appearance: Normal appearance. She is well-developed.      Comments: BMI 41   HENT:      Head: Normocephalic and atraumatic.   Eyes:      General:         Right eye: No discharge.         Left eye: No " discharge.   Neck:      Thyroid: No thyromegaly.      Trachea: Trachea normal.   Cardiovascular:      Rate and Rhythm: Normal rate and regular rhythm.      Heart sounds: Normal heart sounds.   Pulmonary:      Effort: Pulmonary effort is normal.      Breath sounds: Normal breath sounds.   Chest:   Breasts:     Breasts are symmetrical.      Right: Normal. No inverted nipple, mass, nipple discharge, skin change or tenderness.      Left: Normal. No inverted nipple, mass, nipple discharge, skin change or tenderness.          Comments: Bilateral breast reduction scars    Abdominal:      Palpations: Abdomen is soft.   Genitourinary:     General: Normal vulva.      Exam position: Lithotomy position.      Labia:         Right: No rash, tenderness, lesion or injury.         Left: No rash, tenderness, lesion or injury.       Urethra: No prolapse, urethral pain, urethral swelling or urethral lesion.      Vagina: Normal. No signs of injury and foreign body. No vaginal discharge, erythema, tenderness or bleeding.      Cervix: No cervical motion tenderness, friability or cervical bleeding.      Uterus: Normal.       Adnexa:         Right: No mass, tenderness or fullness.          Left: No mass, tenderness or fullness.        Rectum: No external hemorrhoid.      Comments: Guarded during pelvic exam.   Unable to open speculum to see the full face of the cervix  Musculoskeletal:         General: Normal range of motion.      Cervical back: Normal range of motion and neck supple.   Lymphadenopathy:      Head:      Right side of head: No submental, submandibular or tonsillar adenopathy.      Left side of head: No submental, submandibular or tonsillar adenopathy.      Cervical: No cervical adenopathy.      Upper Body:      Right upper body: No supraclavicular or axillary adenopathy.      Left upper body: No supraclavicular or axillary adenopathy.      Lower Body: No right inguinal adenopathy. No left inguinal adenopathy.   Skin:      General: Skin is warm and dry.   Neurological:      Mental Status: She is alert and oriented to person, place, and time.   Psychiatric:         Mood and Affect: Mood normal.         Behavior: Behavior normal.

## 2024-10-07 ENCOUNTER — ANNUAL EXAM (OUTPATIENT)
Dept: OBGYN CLINIC | Facility: CLINIC | Age: 24
End: 2024-10-07
Payer: COMMERCIAL

## 2024-10-07 VITALS
DIASTOLIC BLOOD PRESSURE: 84 MMHG | WEIGHT: 240.3 LBS | BODY MASS INDEX: 41.03 KG/M2 | SYSTOLIC BLOOD PRESSURE: 110 MMHG | HEIGHT: 64 IN

## 2024-10-07 DIAGNOSIS — Z01.419 ENCOUNTER FOR GYNECOLOGICAL EXAMINATION: Primary | ICD-10-CM

## 2024-10-07 DIAGNOSIS — Z30.41 ORAL CONTRACEPTIVE USE: ICD-10-CM

## 2024-10-07 PROCEDURE — G0145 SCR C/V CYTO,THINLAYER,RESCR: HCPCS | Performed by: NURSE PRACTITIONER

## 2024-10-07 PROCEDURE — 99395 PREV VISIT EST AGE 18-39: CPT | Performed by: NURSE PRACTITIONER

## 2024-10-07 RX ORDER — LEVONORGESTREL/ETHIN.ESTRADIOL 0.1-0.02MG
1 TABLET ORAL DAILY
Qty: 84 TABLET | Refills: 3 | Status: SHIPPED | OUTPATIENT
Start: 2024-10-07

## 2024-10-07 NOTE — PATIENT INSTRUCTIONS
Pap every 3 years if normal.Collected today.   STI testing as indicated.Declined.   Exercise most days of week-minimum of 150-300 minutes per week.   Obtain appropriate diet and hydration.   Calcium 1000 mg (in divided doses-max 600 mg at one time) + 600 vit D daily.  Birth control refilled as requested and sent to pharmacy on file. Take as directed (ACHES reviewed). Benefits, risks and alternatives discussed/reviewed.   Condom use when sexually active for sexually transmitted infection prevention.   HPV 9 vaccine series was completed  Annual mammogram starting at age 40, monthly breast self exam recommended.   Call your insurance company to verify coverage prior to completing any ordered tests.   Return to office in one year or sooner, if needed.

## 2024-10-10 LAB
LAB AP GYN PRIMARY INTERPRETATION: NORMAL
Lab: NORMAL